# Patient Record
Sex: MALE | Race: ASIAN | NOT HISPANIC OR LATINO | URBAN - METROPOLITAN AREA
[De-identification: names, ages, dates, MRNs, and addresses within clinical notes are randomized per-mention and may not be internally consistent; named-entity substitution may affect disease eponyms.]

---

## 2019-10-30 ENCOUNTER — INPATIENT (INPATIENT)
Facility: HOSPITAL | Age: 49
LOS: 4 days | Discharge: HOME | DRG: 552 | End: 2019-11-04
Attending: STUDENT IN AN ORGANIZED HEALTH CARE EDUCATION/TRAINING PROGRAM | Admitting: STUDENT IN AN ORGANIZED HEALTH CARE EDUCATION/TRAINING PROGRAM
Payer: MEDICAID

## 2019-10-30 VITALS
HEART RATE: 108 BPM | SYSTOLIC BLOOD PRESSURE: 144 MMHG | RESPIRATION RATE: 20 BRPM | DIASTOLIC BLOOD PRESSURE: 101 MMHG | OXYGEN SATURATION: 98 % | TEMPERATURE: 97 F

## 2019-10-30 LAB
ANION GAP SERPL CALC-SCNC: 11 MMOL/L — SIGNIFICANT CHANGE UP (ref 9–16)
APPEARANCE UR: CLEAR — SIGNIFICANT CHANGE UP
BASOPHILS # BLD AUTO: 0.04 K/UL — SIGNIFICANT CHANGE UP (ref 0–0.2)
BASOPHILS NFR BLD AUTO: 0.4 % — SIGNIFICANT CHANGE UP (ref 0–2)
BILIRUB UR-MCNC: ABNORMAL
BUN SERPL-MCNC: 19 MG/DL — SIGNIFICANT CHANGE UP (ref 7–23)
CALCIUM SERPL-MCNC: 9.8 MG/DL — SIGNIFICANT CHANGE UP (ref 8.5–10.5)
CHLORIDE SERPL-SCNC: 102 MMOL/L — SIGNIFICANT CHANGE UP (ref 96–108)
CO2 SERPL-SCNC: 25 MMOL/L — SIGNIFICANT CHANGE UP (ref 22–31)
COLOR SPEC: YELLOW — SIGNIFICANT CHANGE UP
CREAT SERPL-MCNC: 1.01 MG/DL — SIGNIFICANT CHANGE UP (ref 0.5–1.3)
DIFF PNL FLD: NEGATIVE — SIGNIFICANT CHANGE UP
EOSINOPHIL # BLD AUTO: 0.07 K/UL — SIGNIFICANT CHANGE UP (ref 0–0.5)
EOSINOPHIL NFR BLD AUTO: 0.7 % — SIGNIFICANT CHANGE UP (ref 0–6)
GLUCOSE SERPL-MCNC: 227 MG/DL — HIGH (ref 70–99)
GLUCOSE UR QL: NEGATIVE — SIGNIFICANT CHANGE UP
HCT VFR BLD CALC: 44.5 % — SIGNIFICANT CHANGE UP (ref 39–50)
HGB BLD-MCNC: 15.7 G/DL — SIGNIFICANT CHANGE UP (ref 13–17)
IMM GRANULOCYTES NFR BLD AUTO: 0.6 % — SIGNIFICANT CHANGE UP (ref 0–1.5)
KETONES UR-MCNC: 15 MG/DL
LEUKOCYTE ESTERASE UR-ACNC: NEGATIVE — SIGNIFICANT CHANGE UP
LYMPHOCYTES # BLD AUTO: 1.32 K/UL — SIGNIFICANT CHANGE UP (ref 1–3.3)
LYMPHOCYTES # BLD AUTO: 12.3 % — LOW (ref 13–44)
MCHC RBC-ENTMCNC: 29.1 PG — SIGNIFICANT CHANGE UP (ref 27–34)
MCHC RBC-ENTMCNC: 35.3 GM/DL — SIGNIFICANT CHANGE UP (ref 32–36)
MCV RBC AUTO: 82.4 FL — SIGNIFICANT CHANGE UP (ref 80–100)
MONOCYTES # BLD AUTO: 0.55 K/UL — SIGNIFICANT CHANGE UP (ref 0–0.9)
MONOCYTES NFR BLD AUTO: 5.1 % — SIGNIFICANT CHANGE UP (ref 2–14)
NEUTROPHILS # BLD AUTO: 8.72 K/UL — HIGH (ref 1.8–7.4)
NEUTROPHILS NFR BLD AUTO: 80.9 % — HIGH (ref 43–77)
NITRITE UR-MCNC: NEGATIVE — SIGNIFICANT CHANGE UP
NRBC # BLD: 0 /100 WBCS — SIGNIFICANT CHANGE UP (ref 0–0)
PH UR: 5.5 — SIGNIFICANT CHANGE UP (ref 5–8)
PLATELET # BLD AUTO: 216 K/UL — SIGNIFICANT CHANGE UP (ref 150–400)
POTASSIUM SERPL-MCNC: 4.2 MMOL/L — SIGNIFICANT CHANGE UP (ref 3.5–5.3)
POTASSIUM SERPL-SCNC: 4.2 MMOL/L — SIGNIFICANT CHANGE UP (ref 3.5–5.3)
PROT UR-MCNC: NEGATIVE MG/DL — SIGNIFICANT CHANGE UP
RBC # BLD: 5.4 M/UL — SIGNIFICANT CHANGE UP (ref 4.2–5.8)
RBC # FLD: 12.1 % — SIGNIFICANT CHANGE UP (ref 10.3–14.5)
SODIUM SERPL-SCNC: 138 MMOL/L — SIGNIFICANT CHANGE UP (ref 132–145)
SP GR SPEC: >=1.03 — SIGNIFICANT CHANGE UP (ref 1–1.03)
UROBILINOGEN FLD QL: 0.2 E.U./DL — SIGNIFICANT CHANGE UP
WBC # BLD: 10.76 K/UL — HIGH (ref 3.8–10.5)
WBC # FLD AUTO: 10.76 K/UL — HIGH (ref 3.8–10.5)

## 2019-10-30 PROCEDURE — 93010 ELECTROCARDIOGRAM REPORT: CPT

## 2019-10-30 PROCEDURE — 99223 1ST HOSP IP/OBS HIGH 75: CPT | Mod: GC

## 2019-10-30 PROCEDURE — 72131 CT LUMBAR SPINE W/O DYE: CPT | Mod: 26

## 2019-10-30 PROCEDURE — 99285 EMERGENCY DEPT VISIT HI MDM: CPT

## 2019-10-30 RX ORDER — HYDROMORPHONE HYDROCHLORIDE 2 MG/ML
0.5 INJECTION INTRAMUSCULAR; INTRAVENOUS; SUBCUTANEOUS ONCE
Refills: 0 | Status: DISCONTINUED | OUTPATIENT
Start: 2019-10-30 | End: 2019-10-31

## 2019-10-30 RX ORDER — KETOROLAC TROMETHAMINE 30 MG/ML
30 SYRINGE (ML) INJECTION ONCE
Refills: 0 | Status: DISCONTINUED | OUTPATIENT
Start: 2019-10-30 | End: 2019-10-30

## 2019-10-30 RX ORDER — OXYCODONE HYDROCHLORIDE 5 MG/1
5 TABLET ORAL ONCE
Refills: 0 | Status: DISCONTINUED | OUTPATIENT
Start: 2019-10-30 | End: 2019-10-31

## 2019-10-30 RX ORDER — DIAZEPAM 5 MG
5 TABLET ORAL ONCE
Refills: 0 | Status: DISCONTINUED | OUTPATIENT
Start: 2019-10-30 | End: 2019-10-30

## 2019-10-30 RX ORDER — HYDROMORPHONE HYDROCHLORIDE 2 MG/ML
1 INJECTION INTRAMUSCULAR; INTRAVENOUS; SUBCUTANEOUS ONCE
Refills: 0 | Status: DISCONTINUED | OUTPATIENT
Start: 2019-10-30 | End: 2019-10-30

## 2019-10-30 RX ORDER — GABAPENTIN 400 MG/1
300 CAPSULE ORAL ONCE
Refills: 0 | Status: COMPLETED | OUTPATIENT
Start: 2019-10-30 | End: 2019-10-30

## 2019-10-30 RX ORDER — FENTANYL CITRATE 50 UG/ML
50 INJECTION INTRAVENOUS ONCE
Refills: 0 | Status: DISCONTINUED | OUTPATIENT
Start: 2019-10-30 | End: 2019-10-30

## 2019-10-30 RX ADMIN — FENTANYL CITRATE 50 MICROGRAM(S): 50 INJECTION INTRAVENOUS at 17:49

## 2019-10-30 RX ADMIN — Medication 30 MILLIGRAM(S): at 17:49

## 2019-10-30 RX ADMIN — HYDROMORPHONE HYDROCHLORIDE 1 MILLIGRAM(S): 2 INJECTION INTRAMUSCULAR; INTRAVENOUS; SUBCUTANEOUS at 22:15

## 2019-10-30 RX ADMIN — GABAPENTIN 300 MILLIGRAM(S): 400 CAPSULE ORAL at 19:43

## 2019-10-30 RX ADMIN — HYDROMORPHONE HYDROCHLORIDE 1 MILLIGRAM(S): 2 INJECTION INTRAMUSCULAR; INTRAVENOUS; SUBCUTANEOUS at 21:37

## 2019-10-30 RX ADMIN — Medication 5 MILLIGRAM(S): at 19:42

## 2019-10-30 NOTE — ED PROVIDER NOTE - CLINICAL SUMMARY MEDICAL DECISION MAKING FREE TEXT BOX
Acute on chronic lumbar radicular back pain w/out christine TTP, no direct trauma, unremarkable CT lumbosacral spine for acute pathology, no evidence of infectious process, no focal neurologic deficits to suggest cauda equina. After several repeated rounds of opioids, benzos, NSAIDS, and gabapentin, pt is still writhing in pain and does not appear to have a history of drug seeking behavior. Will admit for further pain control. Given that pt is urinating well w/good tone, pt is suitable for medical admission.

## 2019-10-30 NOTE — ED ADULT NURSE REASSESSMENT NOTE - NS ED NURSE REASSESS COMMENT FT1
Assumed care of patient, pain still persists, disposition pending. family remains at bedside for emotional support

## 2019-10-30 NOTE — ED ADULT NURSE NOTE - OBJECTIVE STATEMENT
Pt presents to ED c/o left lower back pain nontraumatic sudden onset today, radiating to left lower leg. Pt unable to ambulate due to pain. Denies any painful urination or hematuria.

## 2019-10-30 NOTE — ED PROVIDER NOTE - CARE PLAN
Principal Discharge DX:	Intractable back pain  Secondary Diagnosis:	Lumbar radiculopathy, acute  Secondary Diagnosis:	Ambulatory dysfunction

## 2019-10-30 NOTE — ED ADULT NURSE NOTE - NSIMPLEMENTINTERV_GEN_ALL_ED
Implemented All Fall Risk Interventions:  [place] to call system. Call bell, personal items and telephone within reach. Instruct patient to call for assistance. Room bathroom lighting operational. Non-slip footwear when patient is off stretcher. Physically safe environment: no spills, clutter or unnecessary equipment. Stretcher in lowest position, wheels locked, appropriate side rails in place. Provide visual cue, wrist band, yellow gown, etc. Monitor gait and stability. Monitor for mental status changes and reorient to person, place, and time. Review medications for side effects contributing to fall risk. Reinforce activity limits and safety measures with patient and family.

## 2019-10-30 NOTE — ED PROVIDER NOTE - OBJECTIVE STATEMENT
50 y/o M w/hx HTN. HLD, DM, and lumbar back pain for past several weeks, atraumatic, went to physical therapy today and upon attempting back exercises, developed 10/10 L sided paraspinal lumbar back pain radiating down buttock/thigh, unable to stand. Denies numbness/tingling. No changes in bowel/bladder function. No fever/chills. No anterior abd pain or n/v/c/d. Arrived via EMS extremely uncomfortable, clinically upgraded and evaluated immediately.

## 2019-10-30 NOTE — ED ADULT TRIAGE NOTE - CHIEF COMPLAINT QUOTE
Patient arrives via EMS complaining of left lower back pain radiating down his leg, worse today; patient was picked up at physical therapy where patient has been getting treated for the past 2 weeks for his lower back pain

## 2019-10-30 NOTE — ED PROVIDER NOTE - PHYSICAL EXAMINATION
VITAL SIGNS: I have reviewed nursing notes and confirm.  CONSTITUTIONAL: Well-developed; well-nourished; extremely uncomfortable though A&Ox3 and appropriately following commands  SKIN: Skin exam is warm and dry, no acute rash.  HEAD: Normocephalic; atraumatic.  EYES: PERRL, EOM intact; conjunctiva and sclera clear.  ENT: No nasal discharge; airway clear.  NECK: Supple; non tender.  CARD: S1, S2 normal; no murmurs, gallops, or rubs. Regular rate and rhythm.  RESP: Unlabored. No wheezes, rales or rhonchi.  ABD: soft; non-distended; non-tender  BACK: no midline christine ttp, + L paraspinal TTP, + L upper buttock TTP w/out overlying skin changes  EXT: UE's and RLE full ROM, ROM at L hip limited 2/2 pain exacerbated by ranging LLE. No cyanosis or edema. Non-ttp all ext, distal pulses intact  NEURO: Alert, oriented. Grossly unremarkable.  PSYCH: Cooperative, appropriate.

## 2019-10-31 DIAGNOSIS — M54.42 LUMBAGO WITH SCIATICA, LEFT SIDE: ICD-10-CM

## 2019-10-31 DIAGNOSIS — E11.9 TYPE 2 DIABETES MELLITUS WITHOUT COMPLICATIONS: ICD-10-CM

## 2019-10-31 DIAGNOSIS — M54.16 RADICULOPATHY, LUMBAR REGION: ICD-10-CM

## 2019-10-31 DIAGNOSIS — R63.8 OTHER SYMPTOMS AND SIGNS CONCERNING FOOD AND FLUID INTAKE: ICD-10-CM

## 2019-10-31 DIAGNOSIS — E78.2 MIXED HYPERLIPIDEMIA: ICD-10-CM

## 2019-10-31 DIAGNOSIS — Z91.89 OTHER SPECIFIED PERSONAL RISK FACTORS, NOT ELSEWHERE CLASSIFIED: ICD-10-CM

## 2019-10-31 DIAGNOSIS — I10 ESSENTIAL (PRIMARY) HYPERTENSION: ICD-10-CM

## 2019-10-31 LAB
ALBUMIN SERPL ELPH-MCNC: 4.7 G/DL — SIGNIFICANT CHANGE UP (ref 3.3–5)
ALP SERPL-CCNC: 70 U/L — SIGNIFICANT CHANGE UP (ref 40–120)
ALT FLD-CCNC: 48 U/L — HIGH (ref 10–45)
ANION GAP SERPL CALC-SCNC: 13 MMOL/L — SIGNIFICANT CHANGE UP (ref 5–17)
APTT BLD: 28.3 SEC — SIGNIFICANT CHANGE UP (ref 27.5–36.3)
AST SERPL-CCNC: 27 U/L — SIGNIFICANT CHANGE UP (ref 10–40)
BILIRUB DIRECT SERPL-MCNC: <0.2 MG/DL — SIGNIFICANT CHANGE UP (ref 0–0.2)
BILIRUB INDIRECT FLD-MCNC: >0.2 MG/DL — SIGNIFICANT CHANGE UP (ref 0.2–1)
BILIRUB SERPL-MCNC: 0.4 MG/DL — SIGNIFICANT CHANGE UP (ref 0.2–1.2)
BLD GP AB SCN SERPL QL: NEGATIVE — SIGNIFICANT CHANGE UP
BLD GP AB SCN SERPL QL: NEGATIVE — SIGNIFICANT CHANGE UP
BUN SERPL-MCNC: 16 MG/DL — SIGNIFICANT CHANGE UP (ref 7–23)
CALCIUM SERPL-MCNC: 9.6 MG/DL — SIGNIFICANT CHANGE UP (ref 8.4–10.5)
CHLORIDE SERPL-SCNC: 99 MMOL/L — SIGNIFICANT CHANGE UP (ref 96–108)
CO2 SERPL-SCNC: 22 MMOL/L — SIGNIFICANT CHANGE UP (ref 22–31)
CREAT SERPL-MCNC: 0.67 MG/DL — SIGNIFICANT CHANGE UP (ref 0.5–1.3)
GLUCOSE BLDC GLUCOMTR-MCNC: 170 MG/DL — HIGH (ref 70–99)
GLUCOSE BLDC GLUCOMTR-MCNC: 202 MG/DL — HIGH (ref 70–99)
GLUCOSE BLDC GLUCOMTR-MCNC: 209 MG/DL — HIGH (ref 70–99)
GLUCOSE BLDC GLUCOMTR-MCNC: 246 MG/DL — HIGH (ref 70–99)
GLUCOSE SERPL-MCNC: 256 MG/DL — HIGH (ref 70–99)
HCT VFR BLD CALC: 44.7 % — SIGNIFICANT CHANGE UP (ref 39–50)
HGB BLD-MCNC: 14.8 G/DL — SIGNIFICANT CHANGE UP (ref 13–17)
INR BLD: 1.03 — SIGNIFICANT CHANGE UP (ref 0.88–1.16)
MAGNESIUM SERPL-MCNC: 2 MG/DL — SIGNIFICANT CHANGE UP (ref 1.6–2.6)
MCHC RBC-ENTMCNC: 28.3 PG — SIGNIFICANT CHANGE UP (ref 27–34)
MCHC RBC-ENTMCNC: 33.1 GM/DL — SIGNIFICANT CHANGE UP (ref 32–36)
MCV RBC AUTO: 85.5 FL — SIGNIFICANT CHANGE UP (ref 80–100)
NRBC # BLD: 0 /100 WBCS — SIGNIFICANT CHANGE UP (ref 0–0)
PLATELET # BLD AUTO: 223 K/UL — SIGNIFICANT CHANGE UP (ref 150–400)
POTASSIUM SERPL-MCNC: 4.4 MMOL/L — SIGNIFICANT CHANGE UP (ref 3.5–5.3)
POTASSIUM SERPL-SCNC: 4.4 MMOL/L — SIGNIFICANT CHANGE UP (ref 3.5–5.3)
PROT SERPL-MCNC: 7.9 G/DL — SIGNIFICANT CHANGE UP (ref 6–8.3)
PROTHROM AB SERPL-ACNC: 11.6 SEC — SIGNIFICANT CHANGE UP (ref 10–12.9)
RBC # BLD: 5.23 M/UL — SIGNIFICANT CHANGE UP (ref 4.2–5.8)
RBC # FLD: 11.8 % — SIGNIFICANT CHANGE UP (ref 10.3–14.5)
RH IG SCN BLD-IMP: POSITIVE — SIGNIFICANT CHANGE UP
RH IG SCN BLD-IMP: POSITIVE — SIGNIFICANT CHANGE UP
SODIUM SERPL-SCNC: 134 MMOL/L — LOW (ref 135–145)
WBC # BLD: 8.39 K/UL — SIGNIFICANT CHANGE UP (ref 3.8–10.5)
WBC # FLD AUTO: 8.39 K/UL — SIGNIFICANT CHANGE UP (ref 3.8–10.5)

## 2019-10-31 PROCEDURE — 99233 SBSQ HOSP IP/OBS HIGH 50: CPT | Mod: GC

## 2019-10-31 RX ORDER — SODIUM CHLORIDE 9 MG/ML
1000 INJECTION, SOLUTION INTRAVENOUS
Refills: 0 | Status: DISCONTINUED | OUTPATIENT
Start: 2019-10-31 | End: 2019-11-04

## 2019-10-31 RX ORDER — GABAPENTIN 400 MG/1
300 CAPSULE ORAL EVERY 8 HOURS
Refills: 0 | Status: DISCONTINUED | OUTPATIENT
Start: 2019-10-31 | End: 2019-10-31

## 2019-10-31 RX ORDER — GLUCAGON INJECTION, SOLUTION 0.5 MG/.1ML
1 INJECTION, SOLUTION SUBCUTANEOUS ONCE
Refills: 0 | Status: DISCONTINUED | OUTPATIENT
Start: 2019-10-31 | End: 2019-11-04

## 2019-10-31 RX ORDER — CYCLOBENZAPRINE HYDROCHLORIDE 10 MG/1
5 TABLET, FILM COATED ORAL EVERY 8 HOURS
Refills: 0 | Status: DISCONTINUED | OUTPATIENT
Start: 2019-10-31 | End: 2019-10-31

## 2019-10-31 RX ORDER — ACETAMINOPHEN 500 MG
975 TABLET ORAL EVERY 8 HOURS
Refills: 0 | Status: DISCONTINUED | OUTPATIENT
Start: 2019-10-31 | End: 2019-11-04

## 2019-10-31 RX ORDER — DEXTROSE 50 % IN WATER 50 %
15 SYRINGE (ML) INTRAVENOUS ONCE
Refills: 0 | Status: DISCONTINUED | OUTPATIENT
Start: 2019-10-31 | End: 2019-11-04

## 2019-10-31 RX ORDER — LOSARTAN POTASSIUM 100 MG/1
50 TABLET, FILM COATED ORAL EVERY 24 HOURS
Refills: 0 | Status: DISCONTINUED | OUTPATIENT
Start: 2019-10-31 | End: 2019-11-04

## 2019-10-31 RX ORDER — INSULIN LISPRO 100/ML
3 VIAL (ML) SUBCUTANEOUS
Refills: 0 | Status: DISCONTINUED | OUTPATIENT
Start: 2019-10-31 | End: 2019-11-04

## 2019-10-31 RX ORDER — INFLUENZA VIRUS VACCINE 15; 15; 15; 15 UG/.5ML; UG/.5ML; UG/.5ML; UG/.5ML
0.5 SUSPENSION INTRAMUSCULAR ONCE
Refills: 0 | Status: DISCONTINUED | OUTPATIENT
Start: 2019-10-31 | End: 2019-11-04

## 2019-10-31 RX ORDER — DEXTROSE 50 % IN WATER 50 %
12.5 SYRINGE (ML) INTRAVENOUS ONCE
Refills: 0 | Status: DISCONTINUED | OUTPATIENT
Start: 2019-10-31 | End: 2019-11-04

## 2019-10-31 RX ORDER — POLYETHYLENE GLYCOL 3350 17 G/17G
17 POWDER, FOR SOLUTION ORAL DAILY
Refills: 0 | Status: DISCONTINUED | OUTPATIENT
Start: 2019-10-31 | End: 2019-11-04

## 2019-10-31 RX ORDER — HYDROMORPHONE HYDROCHLORIDE 2 MG/ML
0.5 INJECTION INTRAMUSCULAR; INTRAVENOUS; SUBCUTANEOUS ONCE
Refills: 0 | Status: DISCONTINUED | OUTPATIENT
Start: 2019-10-31 | End: 2019-10-31

## 2019-10-31 RX ORDER — OXYCODONE HYDROCHLORIDE 5 MG/1
5 TABLET ORAL EVERY 4 HOURS
Refills: 0 | Status: DISCONTINUED | OUTPATIENT
Start: 2019-10-31 | End: 2019-11-04

## 2019-10-31 RX ORDER — HYDROMORPHONE HYDROCHLORIDE 2 MG/ML
0.75 INJECTION INTRAMUSCULAR; INTRAVENOUS; SUBCUTANEOUS EVERY 4 HOURS
Refills: 0 | Status: DISCONTINUED | OUTPATIENT
Start: 2019-10-31 | End: 2019-10-31

## 2019-10-31 RX ORDER — DEXTROSE 50 % IN WATER 50 %
25 SYRINGE (ML) INTRAVENOUS ONCE
Refills: 0 | Status: DISCONTINUED | OUTPATIENT
Start: 2019-10-31 | End: 2019-11-04

## 2019-10-31 RX ORDER — LIDOCAINE 4 G/100G
1 CREAM TOPICAL DAILY
Refills: 0 | Status: DISCONTINUED | OUTPATIENT
Start: 2019-10-31 | End: 2019-11-04

## 2019-10-31 RX ORDER — CYCLOBENZAPRINE HYDROCHLORIDE 10 MG/1
5 TABLET, FILM COATED ORAL EVERY 8 HOURS
Refills: 0 | Status: DISCONTINUED | OUTPATIENT
Start: 2019-10-31 | End: 2019-11-04

## 2019-10-31 RX ORDER — GABAPENTIN 400 MG/1
100 CAPSULE ORAL EVERY 8 HOURS
Refills: 0 | Status: DISCONTINUED | OUTPATIENT
Start: 2019-10-31 | End: 2019-11-01

## 2019-10-31 RX ORDER — SIMVASTATIN 20 MG/1
20 TABLET, FILM COATED ORAL AT BEDTIME
Refills: 0 | Status: DISCONTINUED | OUTPATIENT
Start: 2019-10-31 | End: 2019-11-04

## 2019-10-31 RX ORDER — INSULIN LISPRO 100/ML
VIAL (ML) SUBCUTANEOUS
Refills: 0 | Status: DISCONTINUED | OUTPATIENT
Start: 2019-10-31 | End: 2019-11-04

## 2019-10-31 RX ORDER — HYDROMORPHONE HYDROCHLORIDE 2 MG/ML
1 INJECTION INTRAMUSCULAR; INTRAVENOUS; SUBCUTANEOUS EVERY 4 HOURS
Refills: 0 | Status: DISCONTINUED | OUTPATIENT
Start: 2019-10-31 | End: 2019-11-04

## 2019-10-31 RX ORDER — INSULIN GLARGINE 100 [IU]/ML
10 INJECTION, SOLUTION SUBCUTANEOUS AT BEDTIME
Refills: 0 | Status: DISCONTINUED | OUTPATIENT
Start: 2019-10-31 | End: 2019-11-04

## 2019-10-31 RX ADMIN — LIDOCAINE 1 PATCH: 4 CREAM TOPICAL at 18:58

## 2019-10-31 RX ADMIN — HYDROMORPHONE HYDROCHLORIDE 0.5 MILLIGRAM(S): 2 INJECTION INTRAMUSCULAR; INTRAVENOUS; SUBCUTANEOUS at 02:00

## 2019-10-31 RX ADMIN — Medication 975 MILLIGRAM(S): at 04:30

## 2019-10-31 RX ADMIN — OXYCODONE HYDROCHLORIDE 5 MILLIGRAM(S): 5 TABLET ORAL at 17:57

## 2019-10-31 RX ADMIN — Medication 975 MILLIGRAM(S): at 23:19

## 2019-10-31 RX ADMIN — GABAPENTIN 100 MILLIGRAM(S): 400 CAPSULE ORAL at 13:53

## 2019-10-31 RX ADMIN — HYDROMORPHONE HYDROCHLORIDE 0.5 MILLIGRAM(S): 2 INJECTION INTRAMUSCULAR; INTRAVENOUS; SUBCUTANEOUS at 01:35

## 2019-10-31 RX ADMIN — Medication 975 MILLIGRAM(S): at 13:53

## 2019-10-31 RX ADMIN — Medication 975 MILLIGRAM(S): at 14:53

## 2019-10-31 RX ADMIN — OXYCODONE HYDROCHLORIDE 5 MILLIGRAM(S): 5 TABLET ORAL at 12:29

## 2019-10-31 RX ADMIN — OXYCODONE HYDROCHLORIDE 5 MILLIGRAM(S): 5 TABLET ORAL at 13:29

## 2019-10-31 RX ADMIN — SIMVASTATIN 20 MILLIGRAM(S): 20 TABLET, FILM COATED ORAL at 22:19

## 2019-10-31 RX ADMIN — Medication 975 MILLIGRAM(S): at 05:30

## 2019-10-31 RX ADMIN — CYCLOBENZAPRINE HYDROCHLORIDE 5 MILLIGRAM(S): 10 TABLET, FILM COATED ORAL at 05:46

## 2019-10-31 RX ADMIN — OXYCODONE HYDROCHLORIDE 5 MILLIGRAM(S): 5 TABLET ORAL at 23:19

## 2019-10-31 RX ADMIN — GABAPENTIN 300 MILLIGRAM(S): 400 CAPSULE ORAL at 10:00

## 2019-10-31 RX ADMIN — LOSARTAN POTASSIUM 50 MILLIGRAM(S): 100 TABLET, FILM COATED ORAL at 12:30

## 2019-10-31 RX ADMIN — INSULIN GLARGINE 10 UNIT(S): 100 INJECTION, SOLUTION SUBCUTANEOUS at 22:20

## 2019-10-31 RX ADMIN — OXYCODONE HYDROCHLORIDE 5 MILLIGRAM(S): 5 TABLET ORAL at 06:46

## 2019-10-31 RX ADMIN — POLYETHYLENE GLYCOL 3350 17 GRAM(S): 17 POWDER, FOR SOLUTION ORAL at 12:30

## 2019-10-31 RX ADMIN — HYDROMORPHONE HYDROCHLORIDE 1 MILLIGRAM(S): 2 INJECTION INTRAMUSCULAR; INTRAVENOUS; SUBCUTANEOUS at 15:25

## 2019-10-31 RX ADMIN — HYDROMORPHONE HYDROCHLORIDE 1 MILLIGRAM(S): 2 INJECTION INTRAMUSCULAR; INTRAVENOUS; SUBCUTANEOUS at 10:05

## 2019-10-31 RX ADMIN — Medication 2: at 12:29

## 2019-10-31 RX ADMIN — OXYCODONE HYDROCHLORIDE 5 MILLIGRAM(S): 5 TABLET ORAL at 17:58

## 2019-10-31 RX ADMIN — Medication 2: at 22:19

## 2019-10-31 RX ADMIN — OXYCODONE HYDROCHLORIDE 5 MILLIGRAM(S): 5 TABLET ORAL at 22:19

## 2019-10-31 RX ADMIN — Medication 2: at 07:07

## 2019-10-31 RX ADMIN — CYCLOBENZAPRINE HYDROCHLORIDE 5 MILLIGRAM(S): 10 TABLET, FILM COATED ORAL at 16:25

## 2019-10-31 RX ADMIN — HYDROMORPHONE HYDROCHLORIDE 1 MILLIGRAM(S): 2 INJECTION INTRAMUSCULAR; INTRAVENOUS; SUBCUTANEOUS at 09:50

## 2019-10-31 RX ADMIN — Medication 975 MILLIGRAM(S): at 22:19

## 2019-10-31 RX ADMIN — GABAPENTIN 100 MILLIGRAM(S): 400 CAPSULE ORAL at 22:19

## 2019-10-31 RX ADMIN — Medication 3 UNIT(S): at 18:49

## 2019-10-31 RX ADMIN — HYDROMORPHONE HYDROCHLORIDE 1 MILLIGRAM(S): 2 INJECTION INTRAMUSCULAR; INTRAVENOUS; SUBCUTANEOUS at 15:40

## 2019-10-31 RX ADMIN — Medication 1: at 18:48

## 2019-10-31 RX ADMIN — LIDOCAINE 1 PATCH: 4 CREAM TOPICAL at 12:30

## 2019-10-31 RX ADMIN — OXYCODONE HYDROCHLORIDE 5 MILLIGRAM(S): 5 TABLET ORAL at 05:45

## 2019-10-31 RX ADMIN — Medication 125 MILLIGRAM(S): at 00:46

## 2019-10-31 RX ADMIN — HYDROMORPHONE HYDROCHLORIDE 0.5 MILLIGRAM(S): 2 INJECTION INTRAMUSCULAR; INTRAVENOUS; SUBCUTANEOUS at 04:44

## 2019-10-31 RX ADMIN — HYDROMORPHONE HYDROCHLORIDE 0.5 MILLIGRAM(S): 2 INJECTION INTRAMUSCULAR; INTRAVENOUS; SUBCUTANEOUS at 04:29

## 2019-10-31 NOTE — PHYSICAL THERAPY INITIAL EVALUATION ADULT - MODALITIES TREATMENT COMMENTS
FIM: 0 | Patient 3/10 at start of visit, increased to 10/10 after attempt to ambulate. RN aware | Palpation: Unable to reproduce pain via palpation | Slight increase in LBP when extending left leg, no change with dorsiflexion and hip flexion

## 2019-10-31 NOTE — PHYSICAL THERAPY INITIAL EVALUATION ADULT - ADDITIONAL COMMENTS
Patient lives in apartment, 3 flight walk-up. Patient denies home health assistance or history of falls. Patient reports he has been using an umbrella to help him ambulate since his pain increased 2 days ago. Patient reports he has been receiving PT for his back pain prior to admission.

## 2019-10-31 NOTE — PROGRESS NOTE ADULT - PROBLEM SELECTOR PLAN 2
-sciatica hx  -manage pain as above  -maintain active type and screen  -coags in the morning -sciatica hx  -manage pain as above  -maintain active type and screen  -c/w home Gabapentin 100mg TID

## 2019-10-31 NOTE — PROGRESS NOTE ADULT - PROBLEM SELECTOR PLAN 1
-Patient with chronic sciatica who presents with initially 5/10 back pain 2 weeks ago now 10/10 left sided back pain after PT  -no urinary incontinence or retention, no constipation, and able to move all extremities  -ct lumbar noncontrast L3/L4 disc bulge w/ compression on left L3, will need MR lumbar noncon to evaluate further, differentials include disc herniation lower likelihood of cord compression.   -tylenol standing 975 mg Q8 hrs, oxycodone 5 mg IR Q4 moderate pain, dilaudid 1 mg iv Q4 severe pain, flexeril 5 mg Q8  -orthopedic consult in the AM -Patient with chronic sciatica who presents with initially 5/10 back pain 2 weeks ago now 10/10 left sided back pain after PT  -no urinary incontinence or retention, no constipation, and able to move all extremities, sensation intact   -ct lumbar noncontrast L3/L4 disc bulge w/ compression on left L3, and severe spinal stenosis   -f/u MR lumbar noncon to evaluate further  -ortho following  -tylenol standing 975 mg Q8 hrs, oxycodone 5 mg IR Q4 moderate pain, dilaudid 1 mg iv Q4 severe pain, flexeril 5 mg Q8

## 2019-10-31 NOTE — PROGRESS NOTE ADULT - SUBJECTIVE AND OBJECTIVE BOX
INCOMPLETE    OVERNIGHT EVENTS:    SUBJECTIVE / INTERVAL HPI: Patient seen and examined at bedside.     VITAL SIGNS:  Vital Signs Last 24 Hrs  T(C): 36.7 (31 Oct 2019 08:33), Max: 36.9 (30 Oct 2019 21:40)  T(F): 98 (31 Oct 2019 08:33), Max: 98.5 (30 Oct 2019 21:40)  HR: 118 (31 Oct 2019 08:33) (108 - 120)  BP: 137/82 (31 Oct 2019 08:33) (124/87 - 152/80)  BP(mean): --  RR: 17 (31 Oct 2019 08:33) (17 - 20)  SpO2: 95% (31 Oct 2019 08:33) (95% - 98%)    10-30-19 @ 07:01  -  10-31-19 @ 07:00  --------------------------------------------------------  IN: 240 mL / OUT: 400 mL / NET: -160 mL    10-31-19 @ 07:01  -  10-31-19 @ 12:14  --------------------------------------------------------  IN: 240 mL / OUT: 500 mL / NET: -260 mL        PHYSICAL EXAM:    General: WDWN  HEENT: NC/AT; PERRL, anicteric sclera; MMM  Neck: supple  Cardiovascular: +S1/S2; RRR  Respiratory: CTA B/L; no W/R/R  Gastrointestinal: soft, NT/ND; +BSx4  Extremities: WWP; no edema, clubbing or cyanosis  Vascular: 2+ radial, DP/PT pulses B/L  Neurological: AAOx3; no focal deficits    MEDICATIONS:  MEDICATIONS  (STANDING):  acetaminophen   Tablet .. 975 milliGRAM(s) Oral every 8 hours  dextrose 5%. 1000 milliLiter(s) (50 mL/Hr) IV Continuous <Continuous>  dextrose 50% Injectable 12.5 Gram(s) IV Push once  dextrose 50% Injectable 25 Gram(s) IV Push once  dextrose 50% Injectable 25 Gram(s) IV Push once  gabapentin 100 milliGRAM(s) Oral every 8 hours  influenza   Vaccine 0.5 milliLiter(s) IntraMuscular once  insulin lispro (HumaLOG) corrective regimen sliding scale   SubCutaneous Before meals and at bedtime  lidocaine   Patch 1 Patch Transdermal daily  losartan 50 milliGRAM(s) Oral every 24 hours  polyethylene glycol 3350 17 Gram(s) Oral daily  simvastatin 20 milliGRAM(s) Oral at bedtime    MEDICATIONS  (PRN):  cyclobenzaprine 5 milliGRAM(s) Oral every 8 hours PRN Muscle Spasm  dextrose 40% Gel 15 Gram(s) Oral once PRN Blood Glucose LESS THAN 70 milliGRAM(s)/deciliter  glucagon  Injectable 1 milliGRAM(s) IntraMuscular once PRN Glucose LESS THAN 70 milligrams/deciliter  HYDROmorphone  Injectable 1 milliGRAM(s) IV Push every 4 hours PRN Severe Pain (7 - 10)  oxyCODONE    IR 5 milliGRAM(s) Oral every 4 hours PRN Moderate Pain (4 - 6)      ALLERGIES:  Allergies    No Known Allergies    Intolerances        LABS:                        14.8   8.39  )-----------( 223      ( 31 Oct 2019 07:25 )             44.7     10-31    134<L>  |  99  |  16  ----------------------------<  256<H>  4.4   |  22  |  0.67    Ca    9.6      31 Oct 2019 07:25  Mg     2.0     10-31    TPro  7.9  /  Alb  4.7  /  TBili  0.4  /  DBili  <0.2  /  AST  27  /  ALT  48<H>  /  AlkPhos  70  10-31    PT/INR - ( 31 Oct 2019 07:25 )   PT: 11.6 sec;   INR: 1.03          PTT - ( 31 Oct 2019 07:25 )  PTT:28.3 sec  Urinalysis Basic - ( 30 Oct 2019 22:17 )    Color: Yellow / Appearance: Clear / SG: >=1.030 / pH: x  Gluc: x / Ketone: 15 mg/dL  / Bili: Small / Urobili: 0.2 E.U./dL   Blood: x / Protein: NEGATIVE mg/dL / Nitrite: NEGATIVE   Leuk Esterase: NEGATIVE / RBC: x / WBC x   Sq Epi: x / Non Sq Epi: x / Bacteria: x    CAPILLARY BLOOD GLUCOSE    POCT Blood Glucose.: 246 mg/dL (31 Oct 2019 12:01)    RADIOLOGY & ADDITIONAL TESTS: Reviewed. OVERNIGHT EVENTS: JUAN    SUBJECTIVE / INTERVAL HPI: Patient seen and examined at bedside. Reports ongoing pain Left lower back especially when bending forward. Denies bowel or urinary incontinence, decreased sensation, CP, SOB, N/V/F/C.     VITAL SIGNS:  Vital Signs Last 24 Hrs  T(C): 36.7 (31 Oct 2019 08:33), Max: 36.9 (30 Oct 2019 21:40)  T(F): 98 (31 Oct 2019 08:33), Max: 98.5 (30 Oct 2019 21:40)  HR: 118 (31 Oct 2019 08:33) (108 - 120)  BP: 137/82 (31 Oct 2019 08:33) (124/87 - 152/80)  BP(mean): --  RR: 17 (31 Oct 2019 08:33) (17 - 20)  SpO2: 95% (31 Oct 2019 08:33) (95% - 98%)    10-30-19 @ 07:01  -  10-31-19 @ 07:00  --------------------------------------------------------  IN: 240 mL / OUT: 400 mL / NET: -160 mL    10-31-19 @ 07:01  -  10-31-19 @ 12:14  --------------------------------------------------------  IN: 240 mL / OUT: 500 mL / NET: -260 mL    PHYSICAL EXAM:    General: WDWN. In mild discomfort due to the LBP   HEENT: NC/AT; PERRL, anicteric sclera; MMM  Neck: supple  Cardiovascular: +S1/S2; RRR  Respiratory: CTA B/L; no W/R/R  Gastrointestinal: soft, NT/ND; +BSx4  Extremities: WWP; no edema, clubbing or cyanosis  MSK: TTP Left lower paravertebral area, L leg raise test with pain radiating down his leg up to his calf   Vascular: 2+ radial, DP/PT pulses B/L  Neurological: AAOx3; no focal deficits    MEDICATIONS:  MEDICATIONS  (STANDING):  acetaminophen   Tablet .. 975 milliGRAM(s) Oral every 8 hours  dextrose 5%. 1000 milliLiter(s) (50 mL/Hr) IV Continuous <Continuous>  dextrose 50% Injectable 12.5 Gram(s) IV Push once  dextrose 50% Injectable 25 Gram(s) IV Push once  dextrose 50% Injectable 25 Gram(s) IV Push once  gabapentin 100 milliGRAM(s) Oral every 8 hours  influenza   Vaccine 0.5 milliLiter(s) IntraMuscular once  insulin lispro (HumaLOG) corrective regimen sliding scale   SubCutaneous Before meals and at bedtime  lidocaine   Patch 1 Patch Transdermal daily  losartan 50 milliGRAM(s) Oral every 24 hours  polyethylene glycol 3350 17 Gram(s) Oral daily  simvastatin 20 milliGRAM(s) Oral at bedtime    MEDICATIONS  (PRN):  cyclobenzaprine 5 milliGRAM(s) Oral every 8 hours PRN Muscle Spasm  dextrose 40% Gel 15 Gram(s) Oral once PRN Blood Glucose LESS THAN 70 milliGRAM(s)/deciliter  glucagon  Injectable 1 milliGRAM(s) IntraMuscular once PRN Glucose LESS THAN 70 milligrams/deciliter  HYDROmorphone  Injectable 1 milliGRAM(s) IV Push every 4 hours PRN Severe Pain (7 - 10)  oxyCODONE    IR 5 milliGRAM(s) Oral every 4 hours PRN Moderate Pain (4 - 6)      ALLERGIES:  Allergies    No Known Allergies    Intolerances        LABS:                        14.8   8.39  )-----------( 223      ( 31 Oct 2019 07:25 )             44.7     10-31    134<L>  |  99  |  16  ----------------------------<  256<H>  4.4   |  22  |  0.67    Ca    9.6      31 Oct 2019 07:25  Mg     2.0     10-31    TPro  7.9  /  Alb  4.7  /  TBili  0.4  /  DBili  <0.2  /  AST  27  /  ALT  48<H>  /  AlkPhos  70  10-31    PT/INR - ( 31 Oct 2019 07:25 )   PT: 11.6 sec;   INR: 1.03          PTT - ( 31 Oct 2019 07:25 )  PTT:28.3 sec  Urinalysis Basic - ( 30 Oct 2019 22:17 )    Color: Yellow / Appearance: Clear / SG: >=1.030 / pH: x  Gluc: x / Ketone: 15 mg/dL  / Bili: Small / Urobili: 0.2 E.U./dL   Blood: x / Protein: NEGATIVE mg/dL / Nitrite: NEGATIVE   Leuk Esterase: NEGATIVE / RBC: x / WBC x   Sq Epi: x / Non Sq Epi: x / Bacteria: x    CAPILLARY BLOOD GLUCOSE    POCT Blood Glucose.: 246 mg/dL (31 Oct 2019 12:01)    RADIOLOGY & ADDITIONAL TESTS: Reviewed.

## 2019-10-31 NOTE — PROGRESS NOTE ADULT - PROBLEM SELECTOR PLAN 7
F-none  E-replete K<4 and Mag <2  N-NPO for now  IMPROVE score 1 F-none  E-replete K<4 and Mag <2  N-DASH/TLC  DVT ppx: none, IMPROVE score 1    Dispo: F  Code: FULL CODE

## 2019-10-31 NOTE — H&P ADULT - PROBLEM SELECTOR PLAN 3
-pt with DM2, unsure of which oral medications he takes  -low dose sliding scale, and monitor glucose  -med rec in the morning

## 2019-10-31 NOTE — PROGRESS NOTE ADULT - ASSESSMENT
Patient is a 48 y/o m w/ hx of htn, hld, DM2, and radiculopathy for several years who presents due to severe 10/10 low back pain. Patient is a 50 y/o m w/ hx of htn, hld, DM2, and radiculopathy for several years who presents due to severe 10/10 low back pain. Pending MRI lumbar spine.

## 2019-10-31 NOTE — H&P ADULT - NSHPSOCIALHISTORY_GEN_ALL_CORE
denies tobacco use  denies heavy alcohol use  denies illicit substances    family history of DM2, HTN, HLD and CAD denies tobacco use  denies heavy alcohol use  denies illicit substances

## 2019-10-31 NOTE — PHYSICAL THERAPY INITIAL EVALUATION ADULT - PERTINENT HX OF CURRENT PROBLEM, REHAB EVAL
48 y/o m w/ hx of htn, hld, DM2, and radiculopathy for several years who presents due to severe 10/10 low back pain. He states this pain started 2 weeks ago and is worse on the left side than the righ

## 2019-10-31 NOTE — H&P ADULT - PROBLEM SELECTOR PLAN 1
-Patient with chronic sciatica who presents with initially 5/10 back pain 2 weeks ago now 10/10 left sided back pain after PT  -no urinary incontinence or retention, no constipation, and able to move all extremities  -ct lumbar noncontrast L3/L4 disc bulge w/ compression on left L3, will need MR lumbar noncon to evaluate further, differentials include disc herniation lower likelihood of cord compression.   -tylenol standing 975 mg Q8 hrs, percocet 5 mg IR moderate pain Q4, oxycodone 5 mg IR Q4 moderate pain, dilaudid 0.75 mg iv Q4 severe pain  -orthopedic consult in the AM -Patient with chronic sciatica who presents with initially 5/10 back pain 2 weeks ago now 10/10 left sided back pain after PT  -no urinary incontinence or retention, no constipation, and able to move all extremities  -ct lumbar noncontrast L3/L4 disc bulge w/ compression on left L3, will need MR lumbar noncon to evaluate further, differentials include disc herniation lower likelihood of cord compression.   -tylenol standing 975 mg Q8 hrs, oxycodone 5 mg IR Q4 moderate pain, dilaudid 1 mg iv Q4 severe pain, flexeril 5 mg Q8  -orthopedic consult in the AM

## 2019-10-31 NOTE — H&P ADULT - ATTENDING COMMENTS
patient seen and examined  reviewed pertinent data, h&p     back pain w/ radiculopathy: pain control , MRI, followup ortho recs     rest of plan as above   medical decision making : high complexity patient seen and examined; used [employer]  [id number] ; reporrts similar back pain episode 6 years ago that resolved after one month.   reviewed pertinent data, h&p    PE findings as above, except pt complaining less of left lower back pain at time of exam and more of L calf cramps; pt in wax/ waning discomfort at time of exam; no pain on palpation of the lower ext b/l; no pain at the lower back on palpation; +Left SLR test; LLEXT motor strength testing limited 2/2 pain; rectal exam findings per PGY2     1. left lower back pain w/ radiculopathy: pain control , MRI, followup ortho recs     rest of plan as above

## 2019-10-31 NOTE — H&P ADULT - HISTORY OF PRESENT ILLNESS
INCOMPLETE    Patient is a 50 y/o m w/ hx of htn, hld, DM2, and radiculopathy for several years who presents due to severe 10/10 low back pain. He states this pain started 2 weeks ago and is worse on the left side than the right. He states the pain goes down his leg and up to his calf. He has had similar quality of pain, but never this severe in nature. He was unable to stand on his legs because of the pain. He was at physical therapy today, and had back exercises with range of motion movement and the pain that start Patient is a 50 y/o m w/ hx of htn, hld, DM2, and radiculopathy for several years who presents due to severe 10/10 low back pain. He states this pain started 2 weeks ago and is worse on the left side than the right. He states the pain goes down his leg and up to his calf. He has had similar quality of pain, but never this severe in nature. He was unable to stand on his legs because of the pain. He was at physical therapy today, and had back exercises with range of motion movement and the pain went from 5/10 to 10/10 in severity. He states it is mainly towards the left lower back and radiated down his left leg. He has never had back surgery in the past. He denies any chest pain, fevers, urinary incontinence or issues, issues with bowel movement, nausea, vomiting, or diarrhea.     ED vital signs were temp of 98.5, , /87, RR 18 saturation 98% RA  CT non con of lumbar showed L3/L4 disc bulge with compression on left L3 foramen, and diffuse spinal stenosis and degenerative changes Patient is a 50 y/o m w/ hx of htn, hld, DM2, and radiculopathy for several years who presents due to severe 10/10 low back pain. He states this pain started 2 weeks ago and is worse on the left side than the right. He states the pain goes down his leg and up to his calf. He has had similar quality of pain, but never this severe in nature. He was unable to stand on his legs because of the pain. He was at physical therapy today, and had back exercises with range of motion movement and the pain went from 5/10 to 10/10 in severity. He states it is mainly towards the left lower back and radiated down his left leg. He has never had back surgery in the past. He denies any chest pain, fevers, urinary incontinence or issues, issues with bowel movement, nausea, vomiting, or diarrhea.     ED vital signs were temp of 98.5, , /87, RR 18 saturation 98% RA  CT non con of lumbar showed L3/L4 disc bulge with compression on left L3 foramen, and diffuse spinal stenosis and degenerative changes. EKG sinus tachycardia w/ early repolarization  s/p fentanyl 50 microgram push, ketorolac 30 mg, diazepam 5 mg, and gabapentin 300 mg times 1

## 2019-10-31 NOTE — PROGRESS NOTE ADULT - PROBLEM SELECTOR PLAN 3
-pt with DM2, unsure of which oral medications he takes  -low dose sliding scale, and monitor glucose  -med rec in the morning Pt with DM2 on Metformin at home   -switch to moderate sliding scale, and monitor glucose

## 2019-10-31 NOTE — H&P ADULT - NSHPLABSRESULTS_GEN_ALL_CORE
LABS:                         15.7   10.76 )-----------( 216      ( 30 Oct 2019 17:58 )             44.5   10-30    138  |  102  |  19  ----------------------------<  227<H>  4.2   |  25  |  1.01    Ca    9.8      30 Oct 2019 17:5      Urinalysis Basic - ( 30 Oct 2019 22:17 )  Color: Yellow / Appearance: Clear / SG: >=1.030 / pH: x  Gluc: x / Ketone: 15 mg/dL  / Bili: Small / Urobili: 0.2 E.U./dL   Blood: x / Protein: NEGATIVE mg/dL / Nitrite: NEGATIVE   Leuk Esterase: NEGATIVE / RBC: x / WBC x   Sq Epi: x / Non Sq Epi: x / Bacteria:

## 2019-10-31 NOTE — H&P ADULT - PROBLEM SELECTOR PLAN 2
-manage pain as above  -maintain active type and screen  -coags in the morning -sciatica hx  -manage pain as above  -maintain active type and screen  -coags in the morning

## 2019-10-31 NOTE — CONSULT NOTE ADULT - SUBJECTIVE AND OBJECTIVE BOX
Orthopaedic Surgery Consult Note    For Surgeon:    HPI:  49yMale  Patient is a 49y old  Male who presents with a chief complaint of Low back pain (31 Oct 2019 03:28)    HPI:  Patient is a 50 y/o m w/ hx of htn, hld, DM2, and radiculopathy for several years who presents w acute on chronic low back pain. He states this pain started 2 weeks ago and is worse on the left side than the right. He states the pain goes down his leg and up to his calf. He has had similar quality of pain, but never this severe in nature. He was unable to stand on his legs because of the pain. states after activity in the PT his pain worsened. has otherweise been in good health.           Allergies    No Known Allergies    Intolerances      PAST MEDICAL & SURGICAL HISTORY:  HLD (hyperlipidemia)  Mild HTN  DM2 (diabetes mellitus, type 2)    MEDICATIONS  (STANDING):  acetaminophen   Tablet .. 975 milliGRAM(s) Oral every 8 hours  dextrose 5%. 1000 milliLiter(s) (50 mL/Hr) IV Continuous <Continuous>  dextrose 50% Injectable 12.5 Gram(s) IV Push once  dextrose 50% Injectable 25 Gram(s) IV Push once  dextrose 50% Injectable 25 Gram(s) IV Push once  gabapentin 300 milliGRAM(s) Oral every 8 hours  influenza   Vaccine 0.5 milliLiter(s) IntraMuscular once  insulin lispro (HumaLOG) corrective regimen sliding scale   SubCutaneous Before meals and at bedtime  lidocaine   Patch 1 Patch Transdermal daily  polyethylene glycol 3350 17 Gram(s) Oral daily    MEDICATIONS  (PRN):  cyclobenzaprine 5 milliGRAM(s) Oral every 8 hours PRN Muscle Spasm  dextrose 40% Gel 15 Gram(s) Oral once PRN Blood Glucose LESS THAN 70 milliGRAM(s)/deciliter  glucagon  Injectable 1 milliGRAM(s) IntraMuscular once PRN Glucose LESS THAN 70 milligrams/deciliter  HYDROmorphone  Injectable 1 milliGRAM(s) IV Push every 4 hours PRN Severe Pain (7 - 10)  oxyCODONE    IR 5 milliGRAM(s) Oral every 4 hours PRN Moderate Pain (4 - 6)      Vital Signs Last 24 Hrs  T(C): 36.7 (31 Oct 2019 08:33), Max: 36.9 (30 Oct 2019 21:40)  T(F): 98 (31 Oct 2019 08:33), Max: 98.5 (30 Oct 2019 21:40)  HR: 118 (31 Oct 2019 08:33) (108 - 120)  BP: 137/82 (31 Oct 2019 08:33) (124/87 - 152/80)  BP(mean): --  RR: 17 (31 Oct 2019 08:33) (17 - 20)  SpO2: 95% (31 Oct 2019 08:33) (95% - 98%)    Physical Exam:  General: in bed well appearing in NAD    Lower extremities  5/5 EHL/FHL/GS/TA b/l  SILT over L1-S1 dermatomes b/l  2+ DP pulse  + SLR on L  no gross deformity of spine                        14.8   8.39  )-----------( 223      ( 31 Oct 2019 07:25 )             44.7     10-31    134<L>  |  99  |  16  ----------------------------<  256<H>  4.4   |  22  |  0.67    Ca    9.6      31 Oct 2019 07:25  Mg     2.0     10-31      PT/INR - ( 31 Oct 2019 07:25 )   PT: 11.6 sec;   INR: 1.03          PTT - ( 31 Oct 2019 07:25 )  PTT:28.3 sec  Imaging:     A/P: 49yMale w LBP and sciatica   - Care as per primary  - Pain control  - WBAT  - PT  - MR lumbar spine to [facility] evaluate if symptoms worsen or continue    -Discussed with Dr. Shukla    Ortho Pager [phone]

## 2019-10-31 NOTE — PROGRESS NOTE ADULT - PROBLEM SELECTOR PLAN 4
-pt with history of htn, unsure of his home medications  -currently BP 120s/80s and pt is asymptomatic pt with history of htn on Losartan 50mg  -c/w Losartan 50mg

## 2019-10-31 NOTE — H&P ADULT - NSHPPHYSICALEXAM_GEN_ALL_CORE
PHYSICAL EXAM:  GENERAL: NAD, well-developed  HEAD:  Atraumatic, Normocephalic  MUSCULOSKELETAL: tenderness to palpation left lower back around L1-L4, left paravertebral muscle hypertonicity  EYES: EOMI, PERRLA, conjunctiva and sclera clear  NECK: Supple, No JVD  CHEST/LUNG: Clear to auscultation bilaterally; No wheeze  HEART: Tachycardic; No murmurs, rubs, or gallops  ABDOMEN: Soft, Nontender, Nondistended; Bowel sounds present  EXTREMITIES:  2+ Peripheral Pulses, No clubbing, cyanosis, or edema  PSYCH: AAOx3  NEUROLOGY: sensation present bilateral lower extremities, 1+patellar reflexes bilateral, rectal tone present, sensation present in anal region  SKIN: No rashes or lesions

## 2019-10-31 NOTE — H&P ADULT - ASSESSMENT
Patient is a 48 y/o m w/ hx of htn, hld, DM2, and radiculopathy for several years who presents due to severe 10/10 low back pain.

## 2019-10-31 NOTE — PATIENT PROFILE ADULT - NSPROMEDSHERBAL_GEN_A_NUR
no
19 y/o F pt with Hx of asthma presents with likely viral URI. Vital signs WNL, lungs clear. Will d/c home with Tessalon Perles and PMD f/u.

## 2019-11-01 LAB
ANION GAP SERPL CALC-SCNC: 14 MMOL/L — SIGNIFICANT CHANGE UP (ref 5–17)
BUN SERPL-MCNC: 18 MG/DL — SIGNIFICANT CHANGE UP (ref 7–23)
CALCIUM SERPL-MCNC: 9.1 MG/DL — SIGNIFICANT CHANGE UP (ref 8.4–10.5)
CHLORIDE SERPL-SCNC: 100 MMOL/L — SIGNIFICANT CHANGE UP (ref 96–108)
CO2 SERPL-SCNC: 23 MMOL/L — SIGNIFICANT CHANGE UP (ref 22–31)
CREAT SERPL-MCNC: 0.81 MG/DL — SIGNIFICANT CHANGE UP (ref 0.5–1.3)
GLUCOSE BLDC GLUCOMTR-MCNC: 145 MG/DL — HIGH (ref 70–99)
GLUCOSE BLDC GLUCOMTR-MCNC: 146 MG/DL — HIGH (ref 70–99)
GLUCOSE BLDC GLUCOMTR-MCNC: 168 MG/DL — HIGH (ref 70–99)
GLUCOSE BLDC GLUCOMTR-MCNC: 191 MG/DL — HIGH (ref 70–99)
GLUCOSE SERPL-MCNC: 169 MG/DL — HIGH (ref 70–99)
HBA1C BLD-MCNC: 7.5 % — HIGH (ref 4–5.6)
HCT VFR BLD CALC: 46.9 % — SIGNIFICANT CHANGE UP (ref 39–50)
HGB BLD-MCNC: 15.4 G/DL — SIGNIFICANT CHANGE UP (ref 13–17)
MAGNESIUM SERPL-MCNC: 2.2 MG/DL — SIGNIFICANT CHANGE UP (ref 1.6–2.6)
MCHC RBC-ENTMCNC: 28.6 PG — SIGNIFICANT CHANGE UP (ref 27–34)
MCHC RBC-ENTMCNC: 32.8 GM/DL — SIGNIFICANT CHANGE UP (ref 32–36)
MCV RBC AUTO: 87 FL — SIGNIFICANT CHANGE UP (ref 80–100)
NRBC # BLD: 0 /100 WBCS — SIGNIFICANT CHANGE UP (ref 0–0)
PLATELET # BLD AUTO: 246 K/UL — SIGNIFICANT CHANGE UP (ref 150–400)
POTASSIUM SERPL-MCNC: 4 MMOL/L — SIGNIFICANT CHANGE UP (ref 3.5–5.3)
POTASSIUM SERPL-SCNC: 4 MMOL/L — SIGNIFICANT CHANGE UP (ref 3.5–5.3)
RBC # BLD: 5.39 M/UL — SIGNIFICANT CHANGE UP (ref 4.2–5.8)
RBC # FLD: 11.9 % — SIGNIFICANT CHANGE UP (ref 10.3–14.5)
SODIUM SERPL-SCNC: 137 MMOL/L — SIGNIFICANT CHANGE UP (ref 135–145)
WBC # BLD: 11.57 K/UL — HIGH (ref 3.8–10.5)
WBC # FLD AUTO: 11.57 K/UL — HIGH (ref 3.8–10.5)

## 2019-11-01 PROCEDURE — 72148 MRI LUMBAR SPINE W/O DYE: CPT | Mod: 26

## 2019-11-01 PROCEDURE — 99233 SBSQ HOSP IP/OBS HIGH 50: CPT

## 2019-11-01 RX ORDER — GABAPENTIN 400 MG/1
300 CAPSULE ORAL EVERY 8 HOURS
Refills: 0 | Status: DISCONTINUED | OUTPATIENT
Start: 2019-11-01 | End: 2019-11-04

## 2019-11-01 RX ORDER — KETOROLAC TROMETHAMINE 30 MG/ML
15 SYRINGE (ML) INJECTION ONCE
Refills: 0 | Status: DISCONTINUED | OUTPATIENT
Start: 2019-11-01 | End: 2019-11-01

## 2019-11-01 RX ADMIN — Medication 1: at 22:29

## 2019-11-01 RX ADMIN — Medication 3 UNIT(S): at 07:35

## 2019-11-01 RX ADMIN — OXYCODONE HYDROCHLORIDE 5 MILLIGRAM(S): 5 TABLET ORAL at 10:56

## 2019-11-01 RX ADMIN — HYDROMORPHONE HYDROCHLORIDE 1 MILLIGRAM(S): 2 INJECTION INTRAMUSCULAR; INTRAVENOUS; SUBCUTANEOUS at 01:50

## 2019-11-01 RX ADMIN — OXYCODONE HYDROCHLORIDE 5 MILLIGRAM(S): 5 TABLET ORAL at 22:36

## 2019-11-01 RX ADMIN — OXYCODONE HYDROCHLORIDE 5 MILLIGRAM(S): 5 TABLET ORAL at 09:56

## 2019-11-01 RX ADMIN — OXYCODONE HYDROCHLORIDE 5 MILLIGRAM(S): 5 TABLET ORAL at 14:07

## 2019-11-01 RX ADMIN — HYDROMORPHONE HYDROCHLORIDE 1 MILLIGRAM(S): 2 INJECTION INTRAMUSCULAR; INTRAVENOUS; SUBCUTANEOUS at 15:53

## 2019-11-01 RX ADMIN — LIDOCAINE 1 PATCH: 4 CREAM TOPICAL at 11:14

## 2019-11-01 RX ADMIN — GABAPENTIN 300 MILLIGRAM(S): 400 CAPSULE ORAL at 21:36

## 2019-11-01 RX ADMIN — Medication 975 MILLIGRAM(S): at 21:36

## 2019-11-01 RX ADMIN — CYCLOBENZAPRINE HYDROCHLORIDE 5 MILLIGRAM(S): 10 TABLET, FILM COATED ORAL at 17:52

## 2019-11-01 RX ADMIN — Medication 975 MILLIGRAM(S): at 14:08

## 2019-11-01 RX ADMIN — Medication 15 MILLIGRAM(S): at 10:40

## 2019-11-01 RX ADMIN — Medication 975 MILLIGRAM(S): at 22:36

## 2019-11-01 RX ADMIN — HYDROMORPHONE HYDROCHLORIDE 1 MILLIGRAM(S): 2 INJECTION INTRAMUSCULAR; INTRAVENOUS; SUBCUTANEOUS at 15:42

## 2019-11-01 RX ADMIN — OXYCODONE HYDROCHLORIDE 5 MILLIGRAM(S): 5 TABLET ORAL at 21:36

## 2019-11-01 RX ADMIN — LOSARTAN POTASSIUM 50 MILLIGRAM(S): 100 TABLET, FILM COATED ORAL at 10:40

## 2019-11-01 RX ADMIN — GABAPENTIN 100 MILLIGRAM(S): 400 CAPSULE ORAL at 05:14

## 2019-11-01 RX ADMIN — Medication 3 UNIT(S): at 17:52

## 2019-11-01 RX ADMIN — LIDOCAINE 1 PATCH: 4 CREAM TOPICAL at 23:14

## 2019-11-01 RX ADMIN — GABAPENTIN 300 MILLIGRAM(S): 400 CAPSULE ORAL at 14:07

## 2019-11-01 RX ADMIN — OXYCODONE HYDROCHLORIDE 5 MILLIGRAM(S): 5 TABLET ORAL at 05:30

## 2019-11-01 RX ADMIN — OXYCODONE HYDROCHLORIDE 5 MILLIGRAM(S): 5 TABLET ORAL at 04:58

## 2019-11-01 RX ADMIN — Medication 975 MILLIGRAM(S): at 05:30

## 2019-11-01 RX ADMIN — HYDROMORPHONE HYDROCHLORIDE 1 MILLIGRAM(S): 2 INJECTION INTRAMUSCULAR; INTRAVENOUS; SUBCUTANEOUS at 01:32

## 2019-11-01 RX ADMIN — Medication 975 MILLIGRAM(S): at 15:08

## 2019-11-01 RX ADMIN — POLYETHYLENE GLYCOL 3350 17 GRAM(S): 17 POWDER, FOR SOLUTION ORAL at 10:40

## 2019-11-01 RX ADMIN — HYDROMORPHONE HYDROCHLORIDE 1 MILLIGRAM(S): 2 INJECTION INTRAMUSCULAR; INTRAVENOUS; SUBCUTANEOUS at 11:15

## 2019-11-01 RX ADMIN — HYDROMORPHONE HYDROCHLORIDE 1 MILLIGRAM(S): 2 INJECTION INTRAMUSCULAR; INTRAVENOUS; SUBCUTANEOUS at 06:08

## 2019-11-01 RX ADMIN — CYCLOBENZAPRINE HYDROCHLORIDE 5 MILLIGRAM(S): 10 TABLET, FILM COATED ORAL at 04:58

## 2019-11-01 RX ADMIN — SIMVASTATIN 20 MILLIGRAM(S): 20 TABLET, FILM COATED ORAL at 21:36

## 2019-11-01 RX ADMIN — INSULIN GLARGINE 10 UNIT(S): 100 INJECTION, SOLUTION SUBCUTANEOUS at 22:28

## 2019-11-01 RX ADMIN — HYDROMORPHONE HYDROCHLORIDE 1 MILLIGRAM(S): 2 INJECTION INTRAMUSCULAR; INTRAVENOUS; SUBCUTANEOUS at 11:30

## 2019-11-01 RX ADMIN — Medication 1: at 12:03

## 2019-11-01 RX ADMIN — Medication 3 UNIT(S): at 12:03

## 2019-11-01 RX ADMIN — Medication 975 MILLIGRAM(S): at 05:14

## 2019-11-01 RX ADMIN — LIDOCAINE 1 PATCH: 4 CREAM TOPICAL at 00:00

## 2019-11-01 RX ADMIN — HYDROMORPHONE HYDROCHLORIDE 1 MILLIGRAM(S): 2 INJECTION INTRAMUSCULAR; INTRAVENOUS; SUBCUTANEOUS at 06:30

## 2019-11-01 RX ADMIN — Medication 15 MILLIGRAM(S): at 10:55

## 2019-11-01 RX ADMIN — OXYCODONE HYDROCHLORIDE 5 MILLIGRAM(S): 5 TABLET ORAL at 15:07

## 2019-11-01 NOTE — PROGRESS NOTE ADULT - SUBJECTIVE AND OBJECTIVE BOX
OVERNIGHT EVENTS: JUAN    SUBJECTIVE / INTERVAL HPI: Patient seen and examined at bedside. Reports ongoing pain Left lower back especially when bending forward. Denies bowel or urinary incontinence, decreased sensation, CP, SOB, N/V/F/C.     VITAL SIGNS:  Vital Signs Last 24 Hrs  T(C): 36.6 (01 Nov 2019 08:36), Max: 36.8 (01 Nov 2019 05:09)  T(F): 97.9 (01 Nov 2019 08:36), Max: 98.3 (01 Nov 2019 05:09)  HR: 113 (01 Nov 2019 08:36) (100 - 118)  BP: 150/94 (01 Nov 2019 08:36) (139/82 - 150/94)  BP(mean): --  RR: 17 (01 Nov 2019 08:36) (17 - 18)  SpO2: 97% (01 Nov 2019 08:36) (95% - 97%)    PHYSICAL EXAM:    General: WDWN. In mild discomfort due to the LBP   HEENT: NC/AT; PERRL, anicteric sclera; MMM  Neck: supple  Cardiovascular: +S1/S2; RRR  Respiratory: CTA B/L; no W/R/R  Gastrointestinal: soft, NT/ND; +BSx4  Extremities: WWP; no edema, clubbing or cyanosis  MSK: TTP Left lower paravertebral area, L leg raise test with pain radiating down his leg up to his calf   Vascular: 2+ radial, DP/PT pulses B/L  Neurological: AAOx3; no focal deficits    MEDICATIONS:  MEDICATIONS  (STANDING):  acetaminophen   Tablet .. 975 milliGRAM(s) Oral every 8 hours  dextrose 5%. 1000 milliLiter(s) (50 mL/Hr) IV Continuous <Continuous>  dextrose 50% Injectable 12.5 Gram(s) IV Push once  dextrose 50% Injectable 25 Gram(s) IV Push once  dextrose 50% Injectable 25 Gram(s) IV Push once  gabapentin 300 milliGRAM(s) Oral every 8 hours  influenza   Vaccine 0.5 milliLiter(s) IntraMuscular once  insulin glargine Injectable (LANTUS) 10 Unit(s) SubCutaneous at bedtime  insulin lispro (HumaLOG) corrective regimen sliding scale   SubCutaneous Before meals and at bedtime  insulin lispro Injectable (HumaLOG) 3 Unit(s) SubCutaneous three times a day before meals  lidocaine   Patch 1 Patch Transdermal daily  losartan 50 milliGRAM(s) Oral every 24 hours  polyethylene glycol 3350 17 Gram(s) Oral daily  simvastatin 20 milliGRAM(s) Oral at bedtime    MEDICATIONS  (PRN):  cyclobenzaprine 5 milliGRAM(s) Oral every 8 hours PRN Muscle Spasm  dextrose 40% Gel 15 Gram(s) Oral once PRN Blood Glucose LESS THAN 70 milliGRAM(s)/deciliter  glucagon  Injectable 1 milliGRAM(s) IntraMuscular once PRN Glucose LESS THAN 70 milligrams/deciliter  HYDROmorphone  Injectable 1 milliGRAM(s) IV Push every 4 hours PRN Severe Pain (7 - 10)  oxyCODONE    IR 5 milliGRAM(s) Oral every 4 hours PRN Moderate Pain (4 - 6)    ALLERGIES:  Allergies    No Known Allergies    Intolerances    .  LABS:                         15.4   11.57 )-----------( 246      ( 01 Nov 2019 06:39 )             46.9     11-01    137  |  100  |  18  ----------------------------<  169<H>  4.0   |  23  |  0.81    Ca    9.1      01 Nov 2019 06:39  Mg     2.2     11-01    TPro  7.9  /  Alb  4.7  /  TBili  0.4  /  DBili  <0.2  /  AST  27  /  ALT  48<H>  /  AlkPhos  70  10-31    PT/INR - ( 31 Oct 2019 07:25 )   PT: 11.6 sec;   INR: 1.03          PTT - ( 31 Oct 2019 07:25 )  PTT:28.3 sec  Urinalysis Basic - ( 30 Oct 2019 22:17 )    Color: Yellow / Appearance: Clear / SG: >=1.030 / pH: x  Gluc: x / Ketone: 15 mg/dL  / Bili: Small / Urobili: 0.2 E.U./dL   Blood: x / Protein: NEGATIVE mg/dL / Nitrite: NEGATIVE   Leuk Esterase: NEGATIVE / RBC: x / WBC x   Sq Epi: x / Non Sq Epi: x / Bacteria: x    RADIOLOGY, EKG & ADDITIONAL TESTS: Reviewed.

## 2019-11-01 NOTE — PROGRESS NOTE ADULT - PROBLEM SELECTOR PLAN 1
-Patient with chronic sciatica who presents with initially 5/10 back pain 2 weeks ago now 10/10 left sided back pain after PT  -no urinary incontinence or retention, no constipation, and able to move all extremities, sensation intact   -ct lumbar noncontrast L3/L4 disc bulge w/ compression on left L3, and severe spinal stenosis   -f/u MR lumbar noncon to evaluate further  -ortho following  -tylenol standing 975 mg Q8 hrs, oxycodone 5 mg IR Q4 moderate pain, dilaudid 1 mg iv Q4 severe pain, flexeril 5 mg Q8  -pain management consulted kristopher saldivar

## 2019-11-01 NOTE — PROGRESS NOTE ADULT - ASSESSMENT
Patient is a 48 y/o m w/ hx of htn, hld, DM2, and radiculopathy for several years who presents due to severe 10/10 low back pain. Pending MRI lumbar spine.

## 2019-11-01 NOTE — PROGRESS NOTE ADULT - ATTENDING COMMENTS
Pt. seen and examined by me earlier today; [facility]  service used, ID #[id number]; I have read Dr. Jacobs's note, I agree w/ her findings and plan of care as documented; cont. pain control, f/u MRI, Ortho/Spine recs
Pt. seen and examined by me earlier today; I have read Dr. Jacobs's note, I agree w/ her findings and plan of care as documented; cont. pain control, f/u MRI L-spine, Ortho recs

## 2019-11-01 NOTE — PROGRESS NOTE ADULT - PROBLEM SELECTOR PLAN 7
F-none  E-replete K<4 and Mag <2  N-DASH/TLC  DVT ppx: none, IMPROVE score 1    Dispo: F  Code: FULL CODE

## 2019-11-01 NOTE — PROGRESS NOTE ADULT - SUBJECTIVE AND OBJECTIVE BOX
Ortho Note    Pt in bed stating he has pain down LLE, was able to get some sleep overnight. analgesia just administered as Pt was being examined this AM  Denies CP, SOB, N/V, numbness/tingling     Vital Signs Last 24 Hrs  T(C): 36.8 (11-01-19 @ 05:09), Max: 36.8 (11-01-19 @ 05:09)  T(F): 98.3 (11-01-19 @ 05:09), Max: 98.3 (11-01-19 @ 05:09)  HR: 100 (11-01-19 @ 05:09) (100 - 100)  BP: 140/99 (11-01-19 @ 05:09) (140/99 - 140/99)  BP(mean): --  RR: 18 (11-01-19 @ 05:09) (18 - 18)  SpO2: 96% (11-01-19 @ 05:09) (96% - 96%)      General: Pt Alert and oriented, NAD  Pulses: 2+ DP pulse b/l  Sensation: SILT in L1-S1 dermaltomal distributions b/l  Motor: 5/5 EHL/FHL/TA/GS R side 4+/5 EHL/FHL/GS/TA likely secondary to pain pt was in on L side                          14.8   8.39  )-----------( 223      ( 31 Oct 2019 07:25 )             44.7   31 Oct 2019 07:25    134    |  99     |  16     ----------------------------<  256    4.4     |  22     |  0.67     Mg     2.0       31 Oct 2019 07:25    TPro  7.9    /  Alb  4.7    /  TBili  0.4    /  DBili  <0.2   /  AST  27     /  ALT  48     /  AlkPhos  70     31 Oct 2019 07:25      A/P: 49yMale w acute on chronic worsening LBP after PT session at an outside institution with pain radiating down LLE. No associated weakness, numbness or tingling. At this time pain management as per primary and PT is recommended.  - Stable  - care as per primary  - should new onset of numbness or weakness develop will reassess need for any change in intervention.       Ortho Pager [phone]

## 2019-11-02 LAB
ANION GAP SERPL CALC-SCNC: 11 MMOL/L — SIGNIFICANT CHANGE UP (ref 5–17)
BUN SERPL-MCNC: 17 MG/DL — SIGNIFICANT CHANGE UP (ref 7–23)
CALCIUM SERPL-MCNC: 9 MG/DL — SIGNIFICANT CHANGE UP (ref 8.4–10.5)
CHLORIDE SERPL-SCNC: 101 MMOL/L — SIGNIFICANT CHANGE UP (ref 96–108)
CO2 SERPL-SCNC: 26 MMOL/L — SIGNIFICANT CHANGE UP (ref 22–31)
CREAT SERPL-MCNC: 0.83 MG/DL — SIGNIFICANT CHANGE UP (ref 0.5–1.3)
GLUCOSE BLDC GLUCOMTR-MCNC: 126 MG/DL — HIGH (ref 70–99)
GLUCOSE BLDC GLUCOMTR-MCNC: 146 MG/DL — HIGH (ref 70–99)
GLUCOSE BLDC GLUCOMTR-MCNC: 156 MG/DL — HIGH (ref 70–99)
GLUCOSE BLDC GLUCOMTR-MCNC: 180 MG/DL — HIGH (ref 70–99)
GLUCOSE SERPL-MCNC: 183 MG/DL — HIGH (ref 70–99)
HCT VFR BLD CALC: 49.2 % — SIGNIFICANT CHANGE UP (ref 39–50)
HGB BLD-MCNC: 15.9 G/DL — SIGNIFICANT CHANGE UP (ref 13–17)
MAGNESIUM SERPL-MCNC: 2.3 MG/DL — SIGNIFICANT CHANGE UP (ref 1.6–2.6)
MCHC RBC-ENTMCNC: 28.3 PG — SIGNIFICANT CHANGE UP (ref 27–34)
MCHC RBC-ENTMCNC: 32.3 GM/DL — SIGNIFICANT CHANGE UP (ref 32–36)
MCV RBC AUTO: 87.7 FL — SIGNIFICANT CHANGE UP (ref 80–100)
NRBC # BLD: 0 /100 WBCS — SIGNIFICANT CHANGE UP (ref 0–0)
PLATELET # BLD AUTO: 232 K/UL — SIGNIFICANT CHANGE UP (ref 150–400)
POTASSIUM SERPL-MCNC: 4.6 MMOL/L — SIGNIFICANT CHANGE UP (ref 3.5–5.3)
POTASSIUM SERPL-SCNC: 4.6 MMOL/L — SIGNIFICANT CHANGE UP (ref 3.5–5.3)
RBC # BLD: 5.61 M/UL — SIGNIFICANT CHANGE UP (ref 4.2–5.8)
RBC # FLD: 11.9 % — SIGNIFICANT CHANGE UP (ref 10.3–14.5)
SODIUM SERPL-SCNC: 138 MMOL/L — SIGNIFICANT CHANGE UP (ref 135–145)
WBC # BLD: 8.24 K/UL — SIGNIFICANT CHANGE UP (ref 3.8–10.5)
WBC # FLD AUTO: 8.24 K/UL — SIGNIFICANT CHANGE UP (ref 3.8–10.5)

## 2019-11-02 PROCEDURE — 99233 SBSQ HOSP IP/OBS HIGH 50: CPT

## 2019-11-02 RX ORDER — SENNA PLUS 8.6 MG/1
1 TABLET ORAL ONCE
Refills: 0 | Status: COMPLETED | OUTPATIENT
Start: 2019-11-02 | End: 2019-11-02

## 2019-11-02 RX ORDER — POLYETHYLENE GLYCOL 3350 17 G/17G
17 POWDER, FOR SOLUTION ORAL ONCE
Refills: 0 | Status: COMPLETED | OUTPATIENT
Start: 2019-11-02 | End: 2019-11-02

## 2019-11-02 RX ADMIN — LIDOCAINE 1 PATCH: 4 CREAM TOPICAL at 18:55

## 2019-11-02 RX ADMIN — HYDROMORPHONE HYDROCHLORIDE 1 MILLIGRAM(S): 2 INJECTION INTRAMUSCULAR; INTRAVENOUS; SUBCUTANEOUS at 20:23

## 2019-11-02 RX ADMIN — OXYCODONE HYDROCHLORIDE 5 MILLIGRAM(S): 5 TABLET ORAL at 18:55

## 2019-11-02 RX ADMIN — HYDROMORPHONE HYDROCHLORIDE 1 MILLIGRAM(S): 2 INJECTION INTRAMUSCULAR; INTRAVENOUS; SUBCUTANEOUS at 02:19

## 2019-11-02 RX ADMIN — CYCLOBENZAPRINE HYDROCHLORIDE 5 MILLIGRAM(S): 10 TABLET, FILM COATED ORAL at 06:47

## 2019-11-02 RX ADMIN — POLYETHYLENE GLYCOL 3350 17 GRAM(S): 17 POWDER, FOR SOLUTION ORAL at 06:46

## 2019-11-02 RX ADMIN — SENNA PLUS 1 TABLET(S): 8.6 TABLET ORAL at 06:46

## 2019-11-02 RX ADMIN — Medication 975 MILLIGRAM(S): at 05:30

## 2019-11-02 RX ADMIN — GABAPENTIN 300 MILLIGRAM(S): 400 CAPSULE ORAL at 14:02

## 2019-11-02 RX ADMIN — HYDROMORPHONE HYDROCHLORIDE 1 MILLIGRAM(S): 2 INJECTION INTRAMUSCULAR; INTRAVENOUS; SUBCUTANEOUS at 21:23

## 2019-11-02 RX ADMIN — GABAPENTIN 300 MILLIGRAM(S): 400 CAPSULE ORAL at 05:30

## 2019-11-02 RX ADMIN — OXYCODONE HYDROCHLORIDE 5 MILLIGRAM(S): 5 TABLET ORAL at 13:40

## 2019-11-02 RX ADMIN — OXYCODONE HYDROCHLORIDE 5 MILLIGRAM(S): 5 TABLET ORAL at 06:47

## 2019-11-02 RX ADMIN — Medication 1: at 16:45

## 2019-11-02 RX ADMIN — HYDROMORPHONE HYDROCHLORIDE 1 MILLIGRAM(S): 2 INJECTION INTRAMUSCULAR; INTRAVENOUS; SUBCUTANEOUS at 09:20

## 2019-11-02 RX ADMIN — HYDROMORPHONE HYDROCHLORIDE 1 MILLIGRAM(S): 2 INJECTION INTRAMUSCULAR; INTRAVENOUS; SUBCUTANEOUS at 09:06

## 2019-11-02 RX ADMIN — Medication 975 MILLIGRAM(S): at 15:00

## 2019-11-02 RX ADMIN — Medication 1: at 07:15

## 2019-11-02 RX ADMIN — Medication 975 MILLIGRAM(S): at 14:02

## 2019-11-02 RX ADMIN — Medication 3 UNIT(S): at 12:38

## 2019-11-02 RX ADMIN — GABAPENTIN 300 MILLIGRAM(S): 400 CAPSULE ORAL at 22:08

## 2019-11-02 RX ADMIN — OXYCODONE HYDROCHLORIDE 5 MILLIGRAM(S): 5 TABLET ORAL at 07:47

## 2019-11-02 RX ADMIN — Medication 3 UNIT(S): at 16:45

## 2019-11-02 RX ADMIN — INSULIN GLARGINE 10 UNIT(S): 100 INJECTION, SOLUTION SUBCUTANEOUS at 21:51

## 2019-11-02 RX ADMIN — Medication 975 MILLIGRAM(S): at 06:15

## 2019-11-02 RX ADMIN — Medication 975 MILLIGRAM(S): at 21:53

## 2019-11-02 RX ADMIN — Medication 975 MILLIGRAM(S): at 22:53

## 2019-11-02 RX ADMIN — Medication 3 UNIT(S): at 07:15

## 2019-11-02 RX ADMIN — SIMVASTATIN 20 MILLIGRAM(S): 20 TABLET, FILM COATED ORAL at 21:53

## 2019-11-02 RX ADMIN — HYDROMORPHONE HYDROCHLORIDE 1 MILLIGRAM(S): 2 INJECTION INTRAMUSCULAR; INTRAVENOUS; SUBCUTANEOUS at 02:35

## 2019-11-02 RX ADMIN — OXYCODONE HYDROCHLORIDE 5 MILLIGRAM(S): 5 TABLET ORAL at 12:40

## 2019-11-02 RX ADMIN — LIDOCAINE 1 PATCH: 4 CREAM TOPICAL at 12:40

## 2019-11-02 RX ADMIN — POLYETHYLENE GLYCOL 3350 17 GRAM(S): 17 POWDER, FOR SOLUTION ORAL at 12:40

## 2019-11-02 RX ADMIN — OXYCODONE HYDROCHLORIDE 5 MILLIGRAM(S): 5 TABLET ORAL at 18:12

## 2019-11-02 NOTE — PROGRESS NOTE ADULT - ASSESSMENT
Patient is a 48 y/o m w/ hx of htn, hld, DM2, and radiculopathy for several years who presents due to severe 10/10 low back pain. Pending pain management and ortho recs.

## 2019-11-02 NOTE — PROGRESS NOTE ADULT - SUBJECTIVE AND OBJECTIVE BOX
OVERNIGHT EVENTS: JUAN    SUBJECTIVE/INTERVAL HPI: Patient seen and examined at bedside. Reports ongoing pain in the left lower back especially when bending forward. Denies bowel or urinary incontinence, decreased sensation, CP, SOB, N/V/F/C.     VITAL SIGNS:  Vital Signs Last 24 Hrs  T(C): 36.4 (02 Nov 2019 16:27), Max: 36.8 (02 Nov 2019 05:07)  T(F): 97.6 (02 Nov 2019 16:27), Max: 98.3 (02 Nov 2019 05:07)  HR: 100 (02 Nov 2019 16:27) (100 - 124)  BP: 106/69 (02 Nov 2019 16:27) (106/69 - 131/85)  BP(mean): --  RR: 16 (02 Nov 2019 16:27) (16 - 18)  SpO2: 98% (02 Nov 2019 16:27) (96% - 98%)    PHYSICAL EXAM:    General: WDWN. In mild discomfort due to the LBP   HEENT: NC/AT; PERRL, anicteric sclera; MMM  Neck: supple  Cardiovascular: +S1/S2; RRR  Respiratory: CTA B/L; no W/R/R  Gastrointestinal: soft, NT/ND; +BSx4  Extremities: WWP; no edema, clubbing or cyanosis  MSK: TTP Left lower paravertebral area, L leg raise test with pain radiating down his leg up to his calf   Vascular: 2+ radial, DP/PT pulses B/L  Neurological: AAOx3; no focal deficits    MEDICATIONS:  MEDICATIONS  (STANDING):  acetaminophen   Tablet .. 975 milliGRAM(s) Oral every 8 hours  dextrose 5%. 1000 milliLiter(s) (50 mL/Hr) IV Continuous <Continuous>  dextrose 50% Injectable 12.5 Gram(s) IV Push once  dextrose 50% Injectable 25 Gram(s) IV Push once  dextrose 50% Injectable 25 Gram(s) IV Push once  gabapentin 300 milliGRAM(s) Oral every 8 hours  influenza   Vaccine 0.5 milliLiter(s) IntraMuscular once  insulin glargine Injectable (LANTUS) 10 Unit(s) SubCutaneous at bedtime  insulin lispro (HumaLOG) corrective regimen sliding scale   SubCutaneous Before meals and at bedtime  insulin lispro Injectable (HumaLOG) 3 Unit(s) SubCutaneous three times a day before meals  lidocaine   Patch 1 Patch Transdermal daily  losartan 50 milliGRAM(s) Oral every 24 hours  polyethylene glycol 3350 17 Gram(s) Oral daily  simvastatin 20 milliGRAM(s) Oral at bedtime    MEDICATIONS  (PRN):  cyclobenzaprine 5 milliGRAM(s) Oral every 8 hours PRN Muscle Spasm  dextrose 40% Gel 15 Gram(s) Oral once PRN Blood Glucose LESS THAN 70 milliGRAM(s)/deciliter  glucagon  Injectable 1 milliGRAM(s) IntraMuscular once PRN Glucose LESS THAN 70 milligrams/deciliter  HYDROmorphone  Injectable 1 milliGRAM(s) IV Push every 4 hours PRN Severe Pain (7 - 10)  oxyCODONE    IR 5 milliGRAM(s) Oral every 4 hours PRN Moderate Pain (4 - 6)    ALLERGIES:  Allergies    No Known Allergies    Intolerances    .  LABS:                         15.9   8.24  )-----------( 232      ( 02 Nov 2019 07:05 )             49.2     11-02    138  |  101  |  17  ----------------------------<  183<H>  4.6   |  26  |  0.83    Ca    9.0      02 Nov 2019 07:05  Mg     2.3     11-02    RADIOLOGY, EKG & ADDITIONAL TESTS: Reviewed.   < from: MR Lumbar Spine No Cont (11.01.19 @ 18:13) >  IMPRESSION: Limited exam. Degenerative disc disease with small to   moderate size central disc herniation at L1/2 and moderate central disc   herniation at L5/S1. Congenital spinal stenosis with epidural lipomatosis.    < end of copied text >

## 2019-11-02 NOTE — PROVIDER CONTACT NOTE (OTHER) - BACKGROUND
pt. admitted for pain control LBP and sciatica pain. hx of hypertension, DM. Lithuanian speaking but family at bedside and wanted them to interpret at this time.

## 2019-11-02 NOTE — PROGRESS NOTE ADULT - PROBLEM SELECTOR PROBLEM 1
Acute left-sided low back pain with left-sided sciatica

## 2019-11-02 NOTE — PROGRESS NOTE ADULT - PROBLEM SELECTOR PLAN 1
-Patient with chronic sciatica who presents with initially 5/10 back pain 2 weeks ago now 10/10 left sided back pain after PT  -no urinary incontinence or retention, no constipation, and able to move all extremities, sensation intact   -ct lumbar noncontrast L3/L4 disc bulge w/ compression on left L3, and severe spinal stenosis   -MRI lumbar noncon showing degenerative disk disease with small-mod disk herniation L1/2 and mod herniation l5-S1, with congenital spinal stenosis w/ epidural lipomatosis   -ortho re-consulted, appreciate recs   -tylenol standing 975 mg Q8 hrs, oxycodone 5 mg IR Q4 moderate pain, dilaudid 1 mg iv Q4 severe pain, flexeril 5 mg Q8  -pain management consulted appreciate recs -Patient with chronic sciatica who presents with initially 5/10 back pain 2 weeks ago now 10/10 left sided back pain after PT  -no urinary incontinence or retention, no constipation, and able to move all extremities, sensation intact   -ct lumbar noncontrast L3/L4 disc bulge w/ compression on left L3, and severe spinal stenosis   -MRI lumbar noncon showing degenerative disk disease with small-mod disk herniation L1/2 and mod herniation l5-S1, with congenital spinal stenosis w/ epidural lipomatosis   -ortho re-consulted, they will evaluate scan and advise.  -tylenol standing 975 mg Q8 hrs, oxycodone 5 mg IR Q4 moderate pain, dilaudid 1 mg iv Q4 severe pain, flexeril 5 mg Q8  -pain management consulted appreciate recs

## 2019-11-03 LAB
ANION GAP SERPL CALC-SCNC: 12 MMOL/L — SIGNIFICANT CHANGE UP (ref 5–17)
BUN SERPL-MCNC: 15 MG/DL — SIGNIFICANT CHANGE UP (ref 7–23)
CALCIUM SERPL-MCNC: 9.4 MG/DL — SIGNIFICANT CHANGE UP (ref 8.4–10.5)
CHLORIDE SERPL-SCNC: 102 MMOL/L — SIGNIFICANT CHANGE UP (ref 96–108)
CO2 SERPL-SCNC: 25 MMOL/L — SIGNIFICANT CHANGE UP (ref 22–31)
CREAT SERPL-MCNC: 0.67 MG/DL — SIGNIFICANT CHANGE UP (ref 0.5–1.3)
GLUCOSE BLDC GLUCOMTR-MCNC: 128 MG/DL — HIGH (ref 70–99)
GLUCOSE BLDC GLUCOMTR-MCNC: 133 MG/DL — HIGH (ref 70–99)
GLUCOSE BLDC GLUCOMTR-MCNC: 141 MG/DL — HIGH (ref 70–99)
GLUCOSE BLDC GLUCOMTR-MCNC: 148 MG/DL — HIGH (ref 70–99)
GLUCOSE SERPL-MCNC: 155 MG/DL — HIGH (ref 70–99)
HCT VFR BLD CALC: 47.7 % — SIGNIFICANT CHANGE UP (ref 39–50)
HGB BLD-MCNC: 16 G/DL — SIGNIFICANT CHANGE UP (ref 13–17)
MAGNESIUM SERPL-MCNC: 2.1 MG/DL — SIGNIFICANT CHANGE UP (ref 1.6–2.6)
MCHC RBC-ENTMCNC: 28.6 PG — SIGNIFICANT CHANGE UP (ref 27–34)
MCHC RBC-ENTMCNC: 33.5 GM/DL — SIGNIFICANT CHANGE UP (ref 32–36)
MCV RBC AUTO: 85.2 FL — SIGNIFICANT CHANGE UP (ref 80–100)
NRBC # BLD: 0 /100 WBCS — SIGNIFICANT CHANGE UP (ref 0–0)
PLATELET # BLD AUTO: 218 K/UL — SIGNIFICANT CHANGE UP (ref 150–400)
POTASSIUM SERPL-MCNC: 4.4 MMOL/L — SIGNIFICANT CHANGE UP (ref 3.5–5.3)
POTASSIUM SERPL-SCNC: 4.4 MMOL/L — SIGNIFICANT CHANGE UP (ref 3.5–5.3)
RBC # BLD: 5.6 M/UL — SIGNIFICANT CHANGE UP (ref 4.2–5.8)
RBC # FLD: 11.9 % — SIGNIFICANT CHANGE UP (ref 10.3–14.5)
SODIUM SERPL-SCNC: 139 MMOL/L — SIGNIFICANT CHANGE UP (ref 135–145)
WBC # BLD: 6.89 K/UL — SIGNIFICANT CHANGE UP (ref 3.8–10.5)
WBC # FLD AUTO: 6.89 K/UL — SIGNIFICANT CHANGE UP (ref 3.8–10.5)

## 2019-11-03 PROCEDURE — 99233 SBSQ HOSP IP/OBS HIGH 50: CPT

## 2019-11-03 RX ADMIN — Medication 975 MILLIGRAM(S): at 22:27

## 2019-11-03 RX ADMIN — INSULIN GLARGINE 10 UNIT(S): 100 INJECTION, SOLUTION SUBCUTANEOUS at 21:27

## 2019-11-03 RX ADMIN — LOSARTAN POTASSIUM 50 MILLIGRAM(S): 100 TABLET, FILM COATED ORAL at 12:38

## 2019-11-03 RX ADMIN — Medication 975 MILLIGRAM(S): at 06:30

## 2019-11-03 RX ADMIN — Medication 3 UNIT(S): at 07:33

## 2019-11-03 RX ADMIN — Medication 975 MILLIGRAM(S): at 13:42

## 2019-11-03 RX ADMIN — LIDOCAINE 1 PATCH: 4 CREAM TOPICAL at 01:00

## 2019-11-03 RX ADMIN — GABAPENTIN 300 MILLIGRAM(S): 400 CAPSULE ORAL at 21:27

## 2019-11-03 RX ADMIN — CYCLOBENZAPRINE HYDROCHLORIDE 5 MILLIGRAM(S): 10 TABLET, FILM COATED ORAL at 17:11

## 2019-11-03 RX ADMIN — OXYCODONE HYDROCHLORIDE 5 MILLIGRAM(S): 5 TABLET ORAL at 13:41

## 2019-11-03 RX ADMIN — OXYCODONE HYDROCHLORIDE 5 MILLIGRAM(S): 5 TABLET ORAL at 12:37

## 2019-11-03 RX ADMIN — POLYETHYLENE GLYCOL 3350 17 GRAM(S): 17 POWDER, FOR SOLUTION ORAL at 12:37

## 2019-11-03 RX ADMIN — Medication 3 UNIT(S): at 17:11

## 2019-11-03 RX ADMIN — SIMVASTATIN 20 MILLIGRAM(S): 20 TABLET, FILM COATED ORAL at 21:27

## 2019-11-03 RX ADMIN — GABAPENTIN 300 MILLIGRAM(S): 400 CAPSULE ORAL at 05:29

## 2019-11-03 RX ADMIN — Medication 3 UNIT(S): at 12:36

## 2019-11-03 RX ADMIN — LIDOCAINE 1 PATCH: 4 CREAM TOPICAL at 12:36

## 2019-11-03 RX ADMIN — GABAPENTIN 300 MILLIGRAM(S): 400 CAPSULE ORAL at 13:41

## 2019-11-03 RX ADMIN — Medication 975 MILLIGRAM(S): at 14:41

## 2019-11-03 RX ADMIN — Medication 975 MILLIGRAM(S): at 21:27

## 2019-11-03 RX ADMIN — Medication 975 MILLIGRAM(S): at 05:29

## 2019-11-03 NOTE — PROGRESS NOTE ADULT - SUBJECTIVE AND OBJECTIVE BOX
Subjective: Pt seen and examined at bedside. No acute events overnight. Denies chest pain, SOB, palpitations, diaphoresis or weakness.     Vital Signs:              Vital Signs Last 24 Hrs  T(C): 37 (03 Nov 2019 05:20), Max: 37 (03 Nov 2019 05:20)  T(F): 98.6 (03 Nov 2019 05:20), Max: 98.6 (03 Nov 2019 05:20)  HR: 98 (03 Nov 2019 05:20) (98 - 106)  BP: 123/82 (03 Nov 2019 05:20) (106/69 - 123/82)  RR: 13 (03 Nov 2019 05:20) (13 - 16)  SpO2: 98% (03 Nov 2019 05:20) (95% - 98%)      Physical Exam:        General: Awake, alert, cooperative and in NAD.      HEENT: TAQUERIA     Chest: CTA b/l     CVS: S1 S2 of normal intensity; RRR; No murmurs appreciated     Abd: Soft, NT, ND, BS present     Ext: No significant edema appreciated; TTP Left lower paravertebral area, L leg raise test with pain radiating down his leg up to his calf.       Labs:                         16.0   6.89  )-----------( 218      ( 03 Nov 2019 05:48 )             47.7      11-03    139  |  102  |  15  ----------------------------<  155<H>  4.4   |  25  |  0.67    Ca    9.4      03 Nov 2019 05:48  Mg     2.1     11-03    A/P:  Patient is a 48 y/o m w/ hx of htn, hld, DM2, and radiculopathy for several years who presents due to severe 10/10 low back pain. Pending pain management and ortho recs.       Acute left sided low back pain with left-sided sciatica--MRI showing congenital spinal stenosis with epidoral lipomatoris. Ortho evaluated and no acute interventions required. Chronic Process. Pain management to help with pain medications as well as outpt meds.     Lumbar Radiculopathy-conservative management. Pain meds as per Pain Management Team.    Type II Diabetes Mellitus-c/w moderate sliding scale. Check use to establish need for long acting insulin while in the hospital. On Metformin at home.     HTN-BPs stable. Cont with Losartan    Hyperlipidemia-c/w Simvastatin 20mg    Diet- DASH/TLC  Dispo-7woll until evaluated by Pain Management tomorrow Subjective: Pt seen and examined at bedside. No acute events overnight. Denies chest pain, SOB, palpitations, diaphoresis or weakness.     Vital Signs:              Vital Signs Last 24 Hrs  T(C): 37 (03 Nov 2019 05:20), Max: 37 (03 Nov 2019 05:20)  T(F): 98.6 (03 Nov 2019 05:20), Max: 98.6 (03 Nov 2019 05:20)  HR: 98 (03 Nov 2019 05:20) (98 - 106)  BP: 123/82 (03 Nov 2019 05:20) (106/69 - 123/82)  RR: 13 (03 Nov 2019 05:20) (13 - 16)  SpO2: 98% (03 Nov 2019 05:20) (95% - 98%)      Physical Exam:        General: Awake, alert, cooperative and in NAD.      HEENT: TAQUERIA     Chest: CTA b/l     CVS: S1 S2 of normal intensity; RRR; No murmurs appreciated     Abd: Soft, NT, ND, BS present     Ext: No significant edema appreciated; TTP Left lower paravertebral area, L leg raise test with pain radiating down his leg up to his calf.       Labs:                         16.0   6.89  )-----------( 218      ( 03 Nov 2019 05:48 )             47.7      11-03    139  |  102  |  15  ----------------------------<  155<H>  4.4   |  25  |  0.67    Ca    9.4      03 Nov 2019 05:48  Mg     2.1     11-03    A/P:  Patient is a 50 y/o m w/ hx of htn, hld, DM2, and radiculopathy for several years who presents due to severe 10/10 low back pain. Pending pain management and ortho recs.       Acute left sided low back pain with left-sided sciatica--MRI showing congenital spinal stenosis with epidoral lipomatoris. Ortho evaluated and no acute interventions required. Chronic Process. Pain management to help with pain medications as well as outpt meds. Intermittent sinus tach correlates with pain waves. Low suspicion for PE as breathing comfortably in RA. No chest pain no LE edema. Also low susp for infection as afebrile and low wbc	    Lumbar Radiculopathy-conservative management. Pain meds as per Pain Management Team.    Type II Diabetes Mellitus-c/w moderate sliding scale. Check use to establish need for long acting insulin while in the hospital. On Metformin at home.     HTN-BPs stable. Cont with Losartan    Hyperlipidemia-c/w Simvastatin 20mg    Diet- DASH/TLC  Dispo-7woll until evaluated by Pain Management tomorrow

## 2019-11-03 NOTE — PROGRESS NOTE ADULT - SUBJECTIVE AND OBJECTIVE BOX
**Patient Tamazight speaking, patient called friend for translation**    S:  No acute overnight events.  Pain well controlled.    No new neurologic complaints.  Patient states that his back pain and L lateral leg paresthesias are improving    Vital Signs Last 24 Hrs  T(C): 36.7 (03 Nov 2019 16:49), Max: 37 (03 Nov 2019 05:20)  T(F): 98.1 (03 Nov 2019 16:49), Max: 98.6 (03 Nov 2019 05:20)  HR: 105 (03 Nov 2019 16:49) (98 - 106)  BP: 104/73 (03 Nov 2019 16:49) (104/73 - 123/82)  BP(mean): --  RR: 14 (03 Nov 2019 16:49) (13 - 14)  SpO2: 95% (03 Nov 2019 16:49) (95% - 98%)    VSS  General: Well-appearing adult Male lying supine; NAD; A&Ox3  Motor:  LLE- Hip Flex/Knee Ext/TA/EHL/GS 5/5 strength  RLE- Hip Flex/Knee Ext/TA/EHL/GS 5/5 strength  Sensory:  LLE- Small area of decreased sensation over lateral aspect of leg approx L5 dermatome; Otherwise SILT L2-S1 dermatomes   RLE- SILT L2-S1 dermatomes  Vascular:  Feet WWP; DP 2+ bilaterally; Cap refill < 2 sec    I&O's Detail    02 Nov 2019 08:01  -  03 Nov 2019 07:00  --------------------------------------------------------  IN:    Oral Fluid: 480 mL  Total IN: 480 mL    OUT:    Voided: 800 mL  Total OUT: 800 mL    Total NET: -320 mL      03 Nov 2019 07:01  -  03 Nov 2019 20:02  --------------------------------------------------------  IN:    Oral Fluid: 340 mL  Total IN: 340 mL    OUT:    Voided: 400 mL  Total OUT: 400 mL    Total NET: -60 mL          Labs:                        16.0   6.89  )-----------( 218      ( 03 Nov 2019 05:48 )             47.7     03 Nov 2019 05:48    139    |  102    |  15     ----------------------------<  155    4.4     |  25     |  0.67     Ca    9.4        03 Nov 2019 05:48  Mg     2.1       03 Nov 2019 05:48      MRI L spine of very poor quality due to motion artifact and is of limited diagnostic value  CT scan L spine shows degenerative changes and flattening of the lumbar spine

## 2019-11-03 NOTE — PROGRESS NOTE ADULT - ASSESSMENT
A/P: 49y Male with history of chronic lower back pain with CT scan evidence of degenerative changes    - No acute orthopaedic surgical intervention necessary at this time  - Patient has already established care for his chronic lower back pain with outside physician  - Patient may follow up with Dr. Shukla as outpatient if desired for continued management of his back pain  - WBAT bilateral lower extremities  - Pain control as per primary team  - DVT ppx as per primary team  - Remainder of care as per primary team  - Please re-consult orthopedics PRN with any additional questions    Ortho Pager [phone]

## 2019-11-04 VITALS — WEIGHT: 158.07 LBS

## 2019-11-04 LAB
GLUCOSE BLDC GLUCOMTR-MCNC: 117 MG/DL — HIGH (ref 70–99)
GLUCOSE BLDC GLUCOMTR-MCNC: 143 MG/DL — HIGH (ref 70–99)

## 2019-11-04 PROCEDURE — 96375 TX/PRO/DX INJ NEW DRUG ADDON: CPT

## 2019-11-04 PROCEDURE — 80048 BASIC METABOLIC PNL TOTAL CA: CPT

## 2019-11-04 PROCEDURE — 99239 HOSP IP/OBS DSCHRG MGMT >30: CPT | Mod: GC

## 2019-11-04 PROCEDURE — 85027 COMPLETE CBC AUTOMATED: CPT

## 2019-11-04 PROCEDURE — 85610 PROTHROMBIN TIME: CPT

## 2019-11-04 PROCEDURE — 72131 CT LUMBAR SPINE W/O DYE: CPT

## 2019-11-04 PROCEDURE — 96376 TX/PRO/DX INJ SAME DRUG ADON: CPT

## 2019-11-04 PROCEDURE — 81003 URINALYSIS AUTO W/O SCOPE: CPT

## 2019-11-04 PROCEDURE — 97161 PT EVAL LOW COMPLEX 20 MIN: CPT

## 2019-11-04 PROCEDURE — 97116 GAIT TRAINING THERAPY: CPT

## 2019-11-04 PROCEDURE — 86850 RBC ANTIBODY SCREEN: CPT

## 2019-11-04 PROCEDURE — 72148 MRI LUMBAR SPINE W/O DYE: CPT

## 2019-11-04 PROCEDURE — 85025 COMPLETE CBC W/AUTO DIFF WBC: CPT

## 2019-11-04 PROCEDURE — 99285 EMERGENCY DEPT VISIT HI MDM: CPT | Mod: 25

## 2019-11-04 PROCEDURE — 86901 BLOOD TYPING SEROLOGIC RH(D): CPT

## 2019-11-04 PROCEDURE — 86900 BLOOD TYPING SEROLOGIC ABO: CPT

## 2019-11-04 PROCEDURE — 93005 ELECTROCARDIOGRAM TRACING: CPT

## 2019-11-04 PROCEDURE — 85730 THROMBOPLASTIN TIME PARTIAL: CPT

## 2019-11-04 PROCEDURE — 82962 GLUCOSE BLOOD TEST: CPT

## 2019-11-04 PROCEDURE — 80076 HEPATIC FUNCTION PANEL: CPT

## 2019-11-04 PROCEDURE — 83735 ASSAY OF MAGNESIUM: CPT

## 2019-11-04 PROCEDURE — 96374 THER/PROPH/DIAG INJ IV PUSH: CPT

## 2019-11-04 PROCEDURE — 83036 HEMOGLOBIN GLYCOSYLATED A1C: CPT

## 2019-11-04 PROCEDURE — 36415 COLL VENOUS BLD VENIPUNCTURE: CPT

## 2019-11-04 RX ORDER — GABAPENTIN 400 MG/1
1 CAPSULE ORAL
Qty: 90 | Refills: 0
Start: 2019-11-04 | End: 2019-12-03

## 2019-11-04 RX ORDER — LIDOCAINE 4 G/100G
1 CREAM TOPICAL
Qty: 14 | Refills: 0
Start: 2019-11-04 | End: 2019-11-17

## 2019-11-04 RX ORDER — OXYCODONE HYDROCHLORIDE 5 MG/1
1 TABLET ORAL
Qty: 0 | Refills: 0 | DISCHARGE
Start: 2019-11-04

## 2019-11-04 RX ORDER — ACETAMINOPHEN 500 MG
3 TABLET ORAL
Qty: 270 | Refills: 0
Start: 2019-11-04 | End: 2019-12-03

## 2019-11-04 RX ORDER — LOSARTAN POTASSIUM 100 MG/1
1 TABLET, FILM COATED ORAL
Qty: 0 | Refills: 0 | DISCHARGE
Start: 2019-11-04

## 2019-11-04 RX ORDER — SIMVASTATIN 20 MG/1
1 TABLET, FILM COATED ORAL
Qty: 0 | Refills: 0 | DISCHARGE
Start: 2019-11-04

## 2019-11-04 RX ORDER — OXYCODONE HYDROCHLORIDE 5 MG/1
1 TABLET ORAL
Qty: 16 | Refills: 0
Start: 2019-11-04 | End: 2019-11-07

## 2019-11-04 RX ORDER — CYCLOBENZAPRINE HYDROCHLORIDE 10 MG/1
1 TABLET, FILM COATED ORAL
Qty: 90 | Refills: 0
Start: 2019-11-04 | End: 2019-12-03

## 2019-11-04 RX ADMIN — OXYCODONE HYDROCHLORIDE 5 MILLIGRAM(S): 5 TABLET ORAL at 12:21

## 2019-11-04 RX ADMIN — Medication 975 MILLIGRAM(S): at 12:50

## 2019-11-04 RX ADMIN — OXYCODONE HYDROCHLORIDE 5 MILLIGRAM(S): 5 TABLET ORAL at 12:50

## 2019-11-04 RX ADMIN — GABAPENTIN 300 MILLIGRAM(S): 400 CAPSULE ORAL at 05:40

## 2019-11-04 RX ADMIN — LIDOCAINE 1 PATCH: 4 CREAM TOPICAL at 01:00

## 2019-11-04 RX ADMIN — LIDOCAINE 1 PATCH: 4 CREAM TOPICAL at 12:09

## 2019-11-04 RX ADMIN — Medication 975 MILLIGRAM(S): at 12:09

## 2019-11-04 RX ADMIN — Medication 975 MILLIGRAM(S): at 05:40

## 2019-11-04 RX ADMIN — POLYETHYLENE GLYCOL 3350 17 GRAM(S): 17 POWDER, FOR SOLUTION ORAL at 12:09

## 2019-11-04 RX ADMIN — Medication 3 UNIT(S): at 07:05

## 2019-11-04 RX ADMIN — LOSARTAN POTASSIUM 50 MILLIGRAM(S): 100 TABLET, FILM COATED ORAL at 10:29

## 2019-11-04 RX ADMIN — Medication 975 MILLIGRAM(S): at 06:40

## 2019-11-04 RX ADMIN — Medication 3 UNIT(S): at 12:08

## 2019-11-04 RX ADMIN — GABAPENTIN 300 MILLIGRAM(S): 400 CAPSULE ORAL at 12:09

## 2019-11-04 NOTE — DIETITIAN INITIAL EVALUATION ADULT. - OTHER INFO
49y Male with history of HTN, HLD, DM2, chronic lower back pain with CT scan evidence of degenerative changes. No acute orthopaedic surgical intervention necessary at this time. Pain management following.  used as pt Samoan speaking. Pt reports good appetite now and PTA, consuming >75% of meals at this time. Pt limits sugar in diet PTA, denies food allergies. Pt weight stable PTA (~158lb). GI: denies N/V, last BM 11/2, Skin: Saman score 20, Pain: Improved per pt. Encouraged continued adequate PO intake, reviewed DM/heart healthy diet with pt. Pt appeared receptive to education/recommendations. RD to monitor and f/u per protocol.

## 2019-11-04 NOTE — DISCHARGE NOTE PROVIDER - NSDCMRMEDTOKEN_GEN_ALL_CORE_FT
acetaminophen 325 mg oral tablet: 3 tab(s) orally every 8 hours for mild pain MDD:4g per 24hours  cyclobenzaprine 5 mg oral tablet: 1 tab(s) orally every 8 hours, As needed, Muscle Spasm  gabapentin 300 mg oral capsule: 1 cap(s) orally every 8 hours  lidocaine 5% topical film: Apply topically to Left lower back every 24 hours   losartan 50 mg oral tablet: 1 tab(s) orally every 24 hours  oxyCODONE 5 mg oral tablet: 1 tab(s) orally every 4 hours, As needed, Moderate Pain (4 - 6)  simvastatin 20 mg oral tablet: 1 tab(s) orally once a day (at bedtime)

## 2019-11-04 NOTE — DISCHARGE NOTE NURSING/CASE MANAGEMENT/SOCIAL WORK - PATIENT PORTAL LINK FT
You can access the FollowMyHealth Patient Portal offered by [facility] by registering at the following website: [email or web address]. By joining [employer]s FollowMyHealth portal, you will also be able to view your health information using other applications (apps) compatible with our system.

## 2019-11-04 NOTE — DISCHARGE NOTE PROVIDER - CARE PROVIDER_API CALL
Nilesh Shukla)  Orthopaedic Surgery  [facility] [address] [place] [id number]  Phone: [phone]  Fax: [phone]  Follow Up Time:

## 2019-11-04 NOTE — DIETITIAN INITIAL EVALUATION ADULT. - PROBLEM SELECTOR PLAN 1
-Patient with chronic sciatica who presents with initially 5/10 back pain 2 weeks ago now 10/10 left sided back pain after PT  -no urinary incontinence or retention, no constipation, and able to move all extremities  -ct lumbar noncontrast L3/L4 disc bulge w/ compression on left L3, will need MR lumbar noncon to evaluate further, differentials include disc herniation lower likelihood of cord compression.   -tylenol standing 975 mg Q8 hrs, oxycodone 5 mg IR Q4 moderate pain, dilaudid 1 mg iv Q4 severe pain, flexeril 5 mg Q8  -orthopedic consult in the AM

## 2019-11-04 NOTE — DISCHARGE NOTE PROVIDER - NSDCCPCAREPLAN_GEN_ALL_CORE_FT
PRINCIPAL DISCHARGE DIAGNOSIS  Diagnosis: Lumbar pain  Assessment and Plan of Treatment: You presented to the hospital with left lower back pain that progressively worsened over the past 2 weeks. Imaging of your back shows chronic degenerative disease. Orhopedics were consulted and they do not recommend any intervention at this time. For your back pain continue to take Tylenol 975mg 4 times per day and apply one Lidocaine patch on left lower back once a day. For muscle spasm take Flexeril 5mg every 8 hours as needed. If you are still in pain or your pain is worse you can also take Oxycodone 5mg every 4 hours as needed. Please continue with physical therapy as outpatient. A paper script was given to you. We also scheduled an appointment with an orthopedic. Please follow up with the orthopedic doctor for your lower back pain.   In the event you experience urinary or bowel incontinence or notice new onset weakness or decrease sensation in your groin area or extremities please seek medical care immediately as these are signs that might suggest that your spine is being compressed.      SECONDARY DISCHARGE DIAGNOSES  Diagnosis: Ambulatory dysfunction  Assessment and Plan of Treatment:     Diagnosis: Lumbar radiculopathy, acute  Assessment and Plan of Treatment:

## 2019-11-04 NOTE — DISCHARGE NOTE PROVIDER - HOSPITAL COURSE
Patient is a 50 y/o m with PMHx of htn, hld, DM2, and radiculopathy for several years who presents due to severe 10/10 low back pain.         Problem List/Main Diagnoses: Lumbar Radiculopathy     Acute left sided low back pain with left-sided sciatica. CT scan L spine shows degenerative changes and flattening of the lumbar spine. MRI lumbar showing congenital spinal stenosis with epidural lipomatosis. Ortho evaluated and no acute interventions required, chronic process. Pain management was consulted for recs regarding pain medications as outpatient. Intermittent sinus tach correlates with pain waves. Low suspicion for PE as breathing comfortably in RA. No chest pain no LE edema. Also low susp for infection as afebrile and low wbc. Physical therapy evaluated pt and recommends ***        New medications: Flexeril 5mg q8hrs PRN for muscle spasms, Oxycodone 5mg q4hr for severe pain PRN, Tylenol 975mg [id number] for mild pain; Lidocaine patch [id number]; Gabapentin was increased to 300mg TID from 100mg TID.     Labs to be followed outpatient: none    Exam to be followed outpatient: none        Patient was medically optimized, stable and ready for discharge. Plan of care and return precautions were discussed with the patient who verbally stated understanding. Patient is a 50 y/o m with PMHx of htn, hld, DM2, and radiculopathy for several years who presents due to severe 10/10 low back pain.         Problem List/Main Diagnoses: Lumbar Radiculopathy     Acute left sided low back pain with left-sided sciatica. CT scan L spine shows degenerative changes and flattening of the lumbar spine. MRI lumbar showing congenital spinal stenosis with epidural lipomatosis. Ortho evaluated and no acute interventions required, chronic process. Pain management was consulted for recs regarding pain medications as outpatient. Intermittent sinus tach correlates with pain waves. Low suspicion for PE as breathing comfortably in RA. No chest pain no LE edema. Also low susp for infection as afebrile and low wbc. Physical therapy evaluated pt and recommends outpatient physical therapy.        New medications: Flexeril 5mg q8hrs PRN for muscle spasms, Oxycodone 5mg q4hr for severe pain PRN, Tylenol 975mg [id number] for mild pain; Lidocaine patch [id number]; Gabapentin was increased to 300mg TID from 100mg TID.     Labs to be followed outpatient: none    Exam to be followed outpatient: none        Patient was medically optimized, stable and ready for discharge. Plan of care and return precautions were discussed with the patient who verbally stated understanding.

## 2019-11-04 NOTE — CONSULT NOTE ADULT - SUBJECTIVE AND OBJECTIVE BOX
Pain Management Consult [facility] - [facility] [phone]    Chief Complaint: Lower Back Pain    HPI: Patient seen and examined today, patient in nad.  This is a 49y old Male with a history of HLD, HTN, lumbar radiculopathy, and lumbar radiculopathy admitted with severe lower back pain. Patient reports lower back pain that radiates down his Left leg worsened with sitting too long and activity. Patient reports pain relief with current pain medication regimen. Interpretation conducted by patients family friend Adriana, who patient authorizes to interpret for him.         Pain is _x__ sharp ____dull ___burning _x__achy ___ Intensity: ____ mild _x__modx ___severe     Location ____surgical site ____cervical __x___lumbar ____abd ____upper ext____lower ext    Worse with _x___activity __x__movement _____physical therapy___ Rest    Improved with __x__medication _x___rest ____physical therapy      ROS: Const:  _-__febrile   Eyes:___ENT:___CV: -___chest pain  Resp: _-___sob  GI:_-__nausea _-__vomiting ___abd pain ___npo ___clears x__full diet __bm  :___ Musk: _x__pain ___spasm  Skin:___ Neuro:  _-__hjrqphbg_-__xbsxkvcxp_-__ numbness _-__weakness __-_paresth  Psych:__anxiety  Endo:___ Heme:___Allergy:_________, _x__all others reviewed and negative      PAST MEDICAL & SURGICAL HISTORY:  HLD (hyperlipidemia)  Mild HTN  DM2 (diabetes mellitus, type 2)  No significant past surgical history      SH: -___Tobacco   _-__Alcohol                          FH:FAMILY HISTORY:  No pertinent family history in first degree relatives      acetaminophen   Tablet .. 975 milliGRAM(s) Oral every 8 hours  cyclobenzaprine 5 milliGRAM(s) Oral every 8 hours PRN  dextrose 40% Gel 15 Gram(s) Oral once PRN  dextrose 5%. 1000 milliLiter(s) IV Continuous <Continuous>  dextrose 50% Injectable 12.5 Gram(s) IV Push once  dextrose 50% Injectable 25 Gram(s) IV Push once  dextrose 50% Injectable 25 Gram(s) IV Push once  gabapentin 300 milliGRAM(s) Oral every 8 hours  glucagon  Injectable 1 milliGRAM(s) IntraMuscular once PRN  HYDROmorphone  Injectable 1 milliGRAM(s) IV Push every 4 hours PRN  influenza   Vaccine 0.5 milliLiter(s) IntraMuscular once  insulin glargine Injectable (LANTUS) 10 Unit(s) SubCutaneous at bedtime  insulin lispro (HumaLOG) corrective regimen sliding scale   SubCutaneous Before meals and at bedtime  insulin lispro Injectable (HumaLOG) 3 Unit(s) SubCutaneous three times a day before meals  lidocaine   Patch 1 Patch Transdermal daily  losartan 50 milliGRAM(s) Oral every 24 hours  oxyCODONE    IR 5 milliGRAM(s) Oral every 4 hours PRN  polyethylene glycol 3350 17 Gram(s) Oral daily  simvastatin 20 milliGRAM(s) Oral at bedtime      T(C): 36.4 (11-04-19 @ 05:43), Max: 36.8 (11-03-19 @ 20:11)  HR: 90 (11-04-19 @ 05:43) (90 - 110)  BP: 113/78 (11-04-19 @ 05:43) (104/73 - 123/81)  RR: 12 (11-04-19 @ 05:43) (12 - 14)  SpO2: 99% (11-04-19 @ 05:43) (95% - 99%)  Wt(kg): --    T(C): 36.4 (11-04-19 @ 05:43), Max: 36.8 (11-03-19 @ 20:11)  HR: 90 (11-04-19 @ 05:43) (90 - 110)  BP: 113/78 (11-04-19 @ 05:43) (104/73 - 123/81)  RR: 12 (11-04-19 @ 05:43) (12 - 14)  SpO2: 99% (11-04-19 @ 05:43) (95% - 99%)  Wt(kg): --    T(C): 36.4 (11-04-19 @ 05:43), Max: 36.8 (11-03-19 @ 20:11)  HR: 90 (11-04-19 @ 05:43) (90 - 110)  BP: 113/78 (11-04-19 @ 05:43) (104/73 - 123/81)  RR: 12 (11-04-19 @ 05:43) (12 - 14)  SpO2: 99% (11-04-19 @ 05:43) (95% - 99%)  Wt(kg): --      PHYSICAL EXAM:  Gen Appearance: x___no acute distress x___appropriate        Neuro: _x__SILT feet____ EOM Intact Psych: AAOX_3_, _x__mood/affect appropriate        Eyes: _x__conjunctiva WNL  _x____ Pupils equal and round        ENT: _x__ears and nose atraumatic__x_ Hearing grossly intact        Neck: ___trachea midline, no visible masses ___thyroid without palpable mass    Resp: _x__Nml WOB____No tactile fremitus ___clear to auscultation    Cardio: __x_extremities free from edema __x__pedal pulses palpable    GI/Abdomen: _x__soft _x____ Nontender____x__Nondistended_____HSM    Lymphatic: ___no palpable nodes in neck  ___no palpable nodes calves and feet    Skin/Wound: ___Incision, ___Dressing c/d/i,   ____surrounding tissues soft,  ___drain/chest tube present____    Muscular: EHL __5_/5  Gastrocnemius_5__/5    ___absent clubbing/cyanosis        ASSESSMENT: This is a 49y old Male with a history of HLD, HTN, lumbar radiculopathy, and lumbar radiculopathy admitted with severe lower back pain.       Recommended Treatment PLAN:  1. Oxycodone 5mg PO Q4h prn severe pain  2. Dilaudid 1mg Q4h prn breakthrough pain  3. Flexeril 5mg Po Q8h prn muscle spasms  4. x1 lidocaine patch to affected area, 12hours on 12hours off  5. Continue gabapentin 300mg TID  Plan discussed with Dr. Cortes

## 2019-11-04 NOTE — DIETITIAN INITIAL EVALUATION ADULT. - ENERGY NEEDS
Ht (10/31): 167.6cm, Wt (10/31): 71.7kg, IBW: 64.5kg +/-10%, %IBW: 111%, BMI: 25.5   ABW used to calculate energy needs due to pt's current body weight within % IBW.

## 2019-11-04 NOTE — DIETITIAN INITIAL EVALUATION ADULT. - PROBLEM SELECTOR PLAN 4
-pt with history of htn, unsure of his home medications  -currently BP 120s/80s and pt is asymptomatic

## 2019-11-09 ENCOUNTER — EMERGENCY (EMERGENCY)
Facility: HOSPITAL | Age: 49
LOS: 1 days | Discharge: HOME | End: 2019-11-09
Attending: EMERGENCY MEDICINE | Admitting: EMERGENCY MEDICINE
Payer: MEDICAID

## 2019-11-09 VITALS
HEART RATE: 126 BPM | TEMPERATURE: 98 F | HEIGHT: 71 IN | OXYGEN SATURATION: 100 % | DIASTOLIC BLOOD PRESSURE: 97 MMHG | SYSTOLIC BLOOD PRESSURE: 156 MMHG | WEIGHT: 199.96 LBS | RESPIRATION RATE: 18 BRPM

## 2019-11-09 VITALS
OXYGEN SATURATION: 94 % | TEMPERATURE: 98 F | RESPIRATION RATE: 18 BRPM | SYSTOLIC BLOOD PRESSURE: 146 MMHG | HEART RATE: 106 BPM | DIASTOLIC BLOOD PRESSURE: 99 MMHG

## 2019-11-09 PROBLEM — E78.5 HYPERLIPIDEMIA, UNSPECIFIED: Chronic | Status: ACTIVE | Noted: 2019-10-31

## 2019-11-09 PROBLEM — E11.9 TYPE 2 DIABETES MELLITUS WITHOUT COMPLICATIONS: Chronic | Status: ACTIVE | Noted: 2019-10-31

## 2019-11-09 PROBLEM — I10 ESSENTIAL (PRIMARY) HYPERTENSION: Chronic | Status: ACTIVE | Noted: 2019-10-31

## 2019-11-09 PROCEDURE — 99284 EMERGENCY DEPT VISIT MOD MDM: CPT | Mod: 25

## 2019-11-09 PROCEDURE — 96372 THER/PROPH/DIAG INJ SC/IM: CPT

## 2019-11-09 PROCEDURE — 99284 EMERGENCY DEPT VISIT MOD MDM: CPT

## 2019-11-09 RX ORDER — HYDROMORPHONE HYDROCHLORIDE 2 MG/ML
1 INJECTION INTRAMUSCULAR; INTRAVENOUS; SUBCUTANEOUS ONCE
Refills: 0 | Status: DISCONTINUED | OUTPATIENT
Start: 2019-11-09 | End: 2019-11-09

## 2019-11-09 RX ORDER — OXYCODONE AND ACETAMINOPHEN 5; 325 MG/1; MG/1
2 TABLET ORAL ONCE
Refills: 0 | Status: DISCONTINUED | OUTPATIENT
Start: 2019-11-09 | End: 2019-11-09

## 2019-11-09 RX ADMIN — HYDROMORPHONE HYDROCHLORIDE 1 MILLIGRAM(S): 2 INJECTION INTRAMUSCULAR; INTRAVENOUS; SUBCUTANEOUS at 04:15

## 2019-11-09 RX ADMIN — HYDROMORPHONE HYDROCHLORIDE 1 MILLIGRAM(S): 2 INJECTION INTRAMUSCULAR; INTRAVENOUS; SUBCUTANEOUS at 03:59

## 2019-11-09 RX ADMIN — OXYCODONE AND ACETAMINOPHEN 2 TABLET(S): 5; 325 TABLET ORAL at 04:46

## 2019-11-09 NOTE — ED PROVIDER NOTE - OBJECTIVE STATEMENT
48 yo m PMHx DM2, HTN, HLD, lumbar radiculopathy recently admitted to [facility] 10/30-11/4/2019 for severe low back pain.  Had CD and MRI with lumbar spinal stenosis and foraminal stenosis, no cord compression. Discharged home on gabapentin, cyclobenzaprine, percocet and doing physical therapy.  Was doing well on regimen until today, after running out of percocet yesterday.  Had been taking 1 tablet every 6 hours.  Tonight pain is as severe as it was when he was admitted to the hospital.  Radiates down left leg to level of ankle.  No other changes, no LE weakness/tingling/numbness.  No urinary retention, no urinary or fecal incontinence.  No fever or chills.

## 2019-11-09 NOTE — ED ADULT NURSE NOTE - CHPI ED NUR SYMPTOMS NEG
no bruising/no difficulty bearing weight/no stiffness/no fever/no abrasion/no weakness/no numbness/no tingling/no deformity

## 2019-11-09 NOTE — ED PROVIDER NOTE - CLINICAL SUMMARY MEDICAL DECISION MAKING FREE TEXT BOX
Lumbar radiculopathy.  Neuro intact.  No evidence of cord compression or cauda equina.  No signs of infection.  No signs of impaired perfusion of limbs.  Improved with pain medication in ED. Lumbar radiculopathy.  Neuro intact.  No evidence of cord compression or cauda equina.  No signs of infection.  No signs of impaired perfusion of limbs.  Improved with pain medication in ED. Tachycardia noted, similar to prior visit; attributed to pain.

## 2019-11-09 NOTE — ED ADULT NURSE NOTE - OBJECTIVE STATEMENT
Lower back pain. Recently discharge due to lumbar radiculopathy. Pt ran out of oxycodone at home. pain became worst. Pt denies any numbness, tingling or lose control or bladder movement.

## 2019-11-09 NOTE — ED PROVIDER NOTE - ATTENDING CONTRIBUTION TO CARE
49M pmh DM, HTN, HLD, lumbar radiculopathy w/ recent admission for severe low back pain, MRI noted for lumbar spinal stenosis and foraminal stenosis, no cord compression. Doing well at home until rand out of meds. Now w/ severe pain same as at prior admission on L leg to ankle. No loss of bowel/bladder control, no numbness, no urinary retention, no weakness. No acute findings on exam. Concern low back pain, msk pain, unlikely cauda equina, no hx suggestive epidural abscess. Pt feeling much improved after pain control in ED & seeking dc.

## 2019-11-09 NOTE — ED PROVIDER NOTE - PATIENT PORTAL LINK FT
You can access the FollowMyHealth Patient Portal offered by [facility] by registering at the following website: [email or web address]. By joining [employer]’s FollowMyHealth portal, you will also be able to view your health information using other applications (apps) compatible with our system.

## 2019-11-09 NOTE — ED PROVIDER NOTE - NSFOLLOWUPINSTRUCTIONS_ED_ALL_ED_FT
Follow up with your doctor after the weekend.  Return to the Emergency Department if you have any new or worsening symptoms, or if you have any concerns.  _________________________________________________________________  Ðau thâ`n kinh to?a  Sciatica  Image   Ðau thâ`n kinh to?a la` cydney, tê, yê´u ruben?c cydney buô´t do?c ernesto duo`ng di cu?a dây thâ`n kinh t?a. Dây thâ`n kinh t?a ba´t dâ`u o? snehal´t lung va` aldo?y xuô´ng m?t marjorie c?a m?i chân. Dây thâ`n kinh na`y kiê?m soa´t ca´c co o? ca?ng chân va` o? m?t marjorie dâ`u gô´i. Dây th?n kinh này cu~ng ta?o ra ca?m jose´c (ca?m jose´c) cho m?t marjorie du`i, ca?ng chân va` sharif ba`n chân. Ðau thâ`n kinh to?a la` mô?t triê?u licona´ng cu?a mô?t bê?nh ly´ qamar´c la`m hang´ aldo?t ruben?c ti` de` lên dây thâ`n kinh to?a.  Nhìn pyle, cydney thâ`n kinh toa? chi? ?nh hu?ng dê´n mô?t bên cu?a co thê?. Ðau thâ`n kinh to?a thuo`ng tu? julia?i ruben?c du?c diê`u tri? julia?i. Nader mô?t sô´ truo`ng ho?p, cydney thâ`n kinh to?a co´ thê? tro? la?i (ta´i pha´t).  Nguyên nhân gì gây ra?  Ti`nh tra?ng na`y xa?y ra khi co´ chèn ép lên dây thâ`n kinh to?a ruben?c khi dây thâ`n kinh to?a bi? hang´ ch?t. Ði?u dó co´ thê? la` kê´t komal? cu?a:  Mô?t di~a dê?m giu~a ca´c xuong cô?t sô´ng (dô´t sô´ng) lô`i ra komal´ nhiê`u (thoa´t vi? di~a dê?m).Fish~ng thay dô?i liên sierra d?n tu?i th? cu?a di~a dê?m (bê?nh thoa´i ho´a di~a dê?m).Rô´i rell?n cydney a?nh corina?ng dê´n mô?t co o? mông (hô?i licona´ng co hi`nh komal? lê).Xuong mo?c thêm (chô`i xuong) gâ`n dây thâ`n kinh to?a.Bâ´t ky` ibis tô?n hay ga~y vo~ na`o (ga~y xuong) o? vu`ng châ?u.Alexis Bruneian.Khô´i u (hiê´m ga?p).Ði?u gì làm burnett nguy co?  Nh?ng y?u t? marjorie có th? làm quý v? d? b? tình tr?ng này hon:  Shelley ca´c môn thê? corin ti` de` ruben?c e´p lên cô?t sô´ng, aldo?ng ha?n fish hang´ng da´ ruben?c nâng ta?.Co´ claudio´c b?n va` dô? kym ruben?t ke´m.Co´ tiê`n claudio? bi? ch?n ibis o? lung.Co´ tiê`n claudio? bi? phâ~u thuâ?t ? lung.Ng?i nader th?i cynthia dài.Thu?c hiê?n ca´c ruben?t dô?ng liên sierra dê´n gâ?p nguo`i ruben?c nâng vâ?t na?ng la?p di la?p la?i.Béo phì.Các d?u hi?u ho?c tri?u ch?ng là gì?  Ca´c triê?u licona´ng co´ thê? qamar´c nhau tu` nhe? cho dê´n râ´t na?ng va` các tri?u ch?ng dó co´ thê? jack gô`m:  Bâ´t ky` vâ´n dê` na`o nader sô´ các v?n d? na`y o? snehal´t lung, chân, hông ruben?c mông:  Ðau buô´t nhe? ruben?c cydney âm ?.Ca?m jose´c no´ng ra´t.Ðau nho´i.Tê o? marjorie ba´p chân ruben?c sharif ba`n chân.Yê´u chân.Ðau lung r?t darby?u la`m cu? dô?ng julia´ samuel.Fish~ng triê?u licona´ng na`y co´ thê? tr?m tr?ng hon khi ho, maurer´t sami ruben?c cuo`i, ruben?c khi vlad´ vi? ngô`i ruben?c du´ng nader tho`i cynthia da`i. Bi? thu`a cân cu~ng co´ thê? la`m ca´c triê?u licona´ng tr?m tr?ng hon. Nader mô?t sô´ truo`ng ho?p, ca´c triê?u licona´ng co´ thê? tái phát ernesto tho`i cynthia.  Ch?n doán tình tr?ng này fish th? nào?  Tình tr?ng này có th? du?c ch?n doán d?a vào:  Tri?u ch?ng c?a quý v?.Khám th?c th?. Magin jose penny so´c claudio´c julia?e co´ thê? yêu câ`u vlad´ vi? th?c hi?n mô?t sô´ cu? dô?ng nh?t d?nh dê? kiê?m tra xem fish~ng cu? dô?ng do´ co´ la`m julia?i pha´t ca´c triê?u licona´ng không.Quý v? có th? du?c làm các kiê?m tra, jack g?m:  Xét jaci?m máu.Ch?p X coco.Ch?p MRI.Ch?p CT.Tình tr?ng này du?c di?u tr? fish th? nào?  Nader nhiê`u truo`ng ho?p, bê?nh na`y tu? do~ ma` không câ`n b?t c? diê`u tri? gì. Wanda nhiên, viê?c di?u tr? có th? jack g?m:  Jose?m ruben?c diê`u chi?nh ruben?t dô?ng thê? châ´t nader tho`i cynthia bi? cydney.Tâ?p thê? du?c va` ke´o cang dê? la`m burnett s?c b?n c?a bu?ng va` burnett dô? kym ruben?t cu?a cô?ng sô´ng.Licona?m da´ ruben?c chuo`m nóng vào vùng b? ?nh hu?ng.Thu?c d? giúp:  Jose?m cydney va` gi?m sung.Thu giãn các co c?a quý v?.Tiêm thuô´c giu´p jose?m cydney, jose?m ki´ch thích va` jose?m viêm quanh dây thâ`n kinh to?a (steroids).Ph?u thu?t.Tuân th? nh?ng hu?ng d?n này ? nhà:  Thu?c     Ch? s? d?ng thu?c không kê don và thu?c kê don ernesto ch? d?n c?a chuyên jose penny sóc s?c kh?e.Không lái xe ho?c v?n hành máy móc h?ng n?ng nader khi dùng thu?c gi?m cydney du?c kê don.X? trí cydney     N?u du?c ch? d?n, licona?m dá l?nh vào vùng b? ?nh hu?ng.  Cho dá l?nh vào túi ni lông.Ð? samuel t?m ? gi?a da và túi licona?m.Licona?m dá l?nh nader 20 phút, 2–3 l?n m?i ngày.Marjorie khi chuo`m da´, ha~y chuo`m no´ng va`o vu`ng bi? a?nh corina?ng truo´c khi tâ?p thê? du?c ruben?c ernesto chi? dâ~n cu?a chuyên jose penny so´c claudio´c julia?e. S? d?ng robson?n darby?t mà chuyên jose penny sóc s?c kh?e khuy?n ngh?, ch?ng h?n fish túi licona?m darby?t ?m ho?c d?m licona?m nóng.  Ð? samuel t?m ? gi?a da và robson?n darby?t.Iman trì robson?n darby?t nader 20–30 phút.B? robson?n darby?t ra n?u da quý v? cally?n sang màu d? nh?t. Ði?u này d?c bi?t sierra tr?ng n?u quý v? không th? c?m th?y cydney, nóng, hay l?nh. Quý v? có th? có nguy co b? b?ng gutierrez hon.Ho?t d?ng     Tr? l?i sinh ho?t bình thu?ng ernesto ch? d?n c?a chuyên jose penny sóc s?c kh?e. H?i chuyên jose penny sóc s?c kh?e v? các ho?t d?ng nào an toàn cho quý v?.  Tránh nh?ng ho?t d?ng làm cho các tri?u ch?ng c?a quý v? tr?m tr?ng hon.Jaci? ngoi nader fish~ng geri?ng tho`i cynthia yeison´n nader yeison`y. Jaci? ngoi o? tu th? na`m ruben?c du´ng thuo`ng tô´t hon la` ngô`i jaci?.  Khi vlad´ vi? jaci? ngoi nader tho`i cynthia da`i, xem lâ~n va`i ruben?t dô?ng nhe? nha`ng ruben?c ke´o cang giu~a ca´c giai doa?n jaci?. Viê?c na`y se~ giu´p tra´nh cu´ng kh?p va` cydney.Tra´nh ngô`i nader tho`i cynthia da`i không cu? dô?ng. Ð?ng dâ?y va` di la?i i´t nhâ´t mô~i david` mô?t lâ`n.T?p th? d?c va` ke´o cang thu?ng xuyên ernesto ch? d?n c?a chuyên jose penny sóc s?c kh?e.Không nâng b?t k? v?t gì n?ng hon 10 lb (4,5 kg) nader khi vlad´ vi? co´ ca´c triê?u licona´ng cydney thâ`n kinh to?a. Khi không co´ triê?u licona´ng, vlad´ vi? vâ~n câ`n tra´nh nâng vâ?t na?ng, da?c biê?t la` nâng vâ?t na?ng la?p di la?p la?i.Khi vlad´ vi? nâng vâ?t na?ng, ha~y luôn claudio? du?ng ky~ thuâ?t nâng carson` ho?p, jack gô`m:  Gurjit dâ`u gô´i.Giu~ cho vâ?t na?ng gâ`n co thê? vlad´ vi?.Tra´nh va?n nguo`i.Hu?ng d?n pyle     Claudio? du?ng tu thê´ du´ng.  Tra´nh cu´i nguo`i vê` phi´a truo´c nader khi ngô`i.Tra´nh cu´i nguo`i vê` phi´a truo´c nader khi du´ng.Iman trì mu´c cân n?ng có l?i cho s?c kh?e. Cân na?ng komal´ mu´c ta?o thêm a´p desiree?c cho lung vlad´ vi? va` la`m julia´ samuel nader viê?c iman tri` tu thê´ tô´t.Ði jose`y hô~ tro?, thoa?i ma´i. Tra´nh di jose`y gutierrez go´t.Tra´nh robson? trên dê?m komal´ mê`m ruben?c komal´ cu´ng. Ðê?m du? cu´ng dê? nâng do~ lung vlad´ vi? khi vlad´ vi? robson? co´ thê? giu´p làm jose?m cydney.Tuân th? t?t c? các l?n khám ernesto dõi ernesto ch? d?n c?a chuyên jose penny sóc s?c kh?e. Ði?u này có vai trò sierra tr?ng.Hãy liên l?c v?i chuyên jose penny sóc s?c kh?e n?u:  Vlad´ vi? bi? cydney la`m vlad´ vi? thu´c dâ?y khi robson?.Vlad´ vi? bi? cydney va` cydney tr?m tr?ng hon khi na`m xuô´ng.Con cydney cu?a vlad´ vi? tr?m tr?ng hon con cydney da~ tu`ng bi? truo´c dây.Con cydney cu?a vlad´ vi? ke´o da`i hon 4 tuâ`n.Quý v? b? s?t cân không rõ nguyên nhân.Yêu c?u tr? giúp ngay l?p t?c n?u:  Quý v? m?t ki?m soát vi?c d?i ti?n và ti?u ti?n (không tu? licona?).Quý v? bi?:  Yê´u o? snehal´t lung, khung châ?u, mông ruben?c chân tro? nên tr?m tr?ng hon.Ðo? ruben?c sung o? lung.Ca?m jose´c no´ng ra´t khi di tiê?u.Thông tin này không nh?m m?c dích thay th? cho l?i khuymarian lopez jose penny sóc s?c kh?e nói v?i quý v?. Hãy b?o d?m quý v? ph?i th?o desiree?n b?t k? v?n d? gì dilcia quý v? có v?i john jose penny sóc s?c kh?e c?a quý v?.

## 2019-11-09 NOTE — ED PROVIDER NOTE - NS ED ROS FT
CONSTITUTIONAL: No fever, chills, or weakness  NEURO: No headache, no dizziness, no syncope; No focal weakness/tingling/numbness  EYES: No visual changes  ENT: No rhinorrhea or sore throat  PULM: No cough or dyspnea  CV: No chest pain or palpitations  GI: HPI; No abdominal pain, vomiting, or diarrhea  : HPI; No dysuria, hematuria, frequency  MSK: No neck pain, no joint pain  SKIN: no rash or unusual bruising

## 2019-11-09 NOTE — ED PROVIDER NOTE - PHYSICAL EXAMINATION
CONSTITUTIONAL: WD,WN. NAD.    SKIN: Normal color and turgor. No rash.    HEAD: NC/AT.  EYES: Conjunctiva clear. EOMI. PERRL.    ENT: Airway patent, OP without erythema, tonsillar swelling or exudate; uvula midline without swelling. Nasal mucosa clear, no rhinorrhea.   RESPIRATORY:  Breathing non-labored. No retractions or accessory muscle use.  Lungs CTA bilat.  CARDIOVASCULAR:  RRR, S1S2. No M/R/G.    Feet WWP with strong DP and PT pulses bilaterally.   GI:  Abdomen soft, nontender.    MSK: Neck supple with painless ROM.  No lower extremity edema or calf tenderness.  No joint swelling or ROM limitation.  + SLR on left.  NEURO: Alert and oriented; CN II-XII grossly intact. Speech clear. 5/5 strength in all extremities.  5/5 hip flexion, knee flexion, knee extension, foot dorsiflexion and plantarflexion bilaterally.  SILT throughout.  DTR +2 bilat quads, achilles.  Rectal tone strong.

## 2019-11-13 DIAGNOSIS — M51.16 INTERVERTEBRAL DISC DISORDERS WITH RADICULOPATHY, LUMBAR REGION: ICD-10-CM

## 2019-11-13 DIAGNOSIS — M54.9 DORSALGIA, UNSPECIFIED: ICD-10-CM

## 2019-11-13 DIAGNOSIS — E11.9 TYPE 2 DIABETES MELLITUS WITHOUT COMPLICATIONS: ICD-10-CM

## 2019-11-13 DIAGNOSIS — E78.5 HYPERLIPIDEMIA, UNSPECIFIED: ICD-10-CM

## 2019-11-13 DIAGNOSIS — M48.061 SPINAL STENOSIS, LUMBAR REGION WITHOUT NEUROGENIC CLAUDICATION: ICD-10-CM

## 2019-11-13 DIAGNOSIS — I10 ESSENTIAL (PRIMARY) HYPERTENSION: ICD-10-CM

## 2019-11-15 DIAGNOSIS — M54.16 RADICULOPATHY, LUMBAR REGION: ICD-10-CM

## 2019-11-15 DIAGNOSIS — M54.5 LOW BACK PAIN: ICD-10-CM

## 2020-03-06 NOTE — PATIENT PROFILE ADULT - NSPROMEDSPUMP_GEN_A_NUR
[Potential consequences of obesity discussed] : Potential consequences of obesity discussed
[Encouraged to increase physical activity] : Encouraged to increase physical activity
[FreeTextEntry2] : Advised to avoid PROCESSED/FAST FOODS and sugary beverages 
Statement Selected
none

## 2020-05-11 NOTE — ED ADULT NURSE NOTE - NEURO ASSESSMENT
----- Message from Mohini Ramirez sent at 5/11/2020  2:29 PM CDT -----  Contact: self/[phone]  Patient called in regards needing for pcp to place order for the antibody test for covid. Patient is cover by insurance. Please call and advise. Thank you   WDL

## 2024-06-07 NOTE — PATIENT PROFILE ADULT - HEALTH LITERACY
Spoke with pt mom to schedule appt with GC. Pt mom scheduled virtual appt for 6/10 at 1pm. MOP understood and confirmed appt.      ----- Message from Rylie Curry CGC sent at 6/7/2024  9:34 AM CDT -----  Regarding: RE: Please advise  Contact: Mom [phone]  Thanks Rebeka. Yes please schedule with me for results. I could see them on Mon.  ----- Message -----  From: Rebeka Marley CGC  Sent: 6/7/2024   9:24 AM CDT  To: Rylie Curry CGC; Ivelisse Johnston MA  Subject: FW: Please advise                                Brandie-this one is your patient  ----- Message -----  From: Ivelisse Johnston MA  Sent: 6/7/2024   9:17 AM CDT  To: Rebeka Marley CGC  Subject: Please advise                                    This is a pt you wanted for a FU. Mom is just returning the phone call. Do you want to place her anywhere in specific?  ----- Message -----  From: Barbra Forrest  Sent: 6/7/2024   9:13 AM CDT  To: [facility] Clinical Support Staff    Returning a phone call.  Who left a message for the patient:  Nurse  Do they know what this is regarding:  Yes; scheduling a genetics appt  Would they like a phone call back or a response via [facility]:  Call Back and Portal   no